# Patient Record
Sex: FEMALE | Race: WHITE | NOT HISPANIC OR LATINO | Employment: OTHER | URBAN - METROPOLITAN AREA
[De-identification: names, ages, dates, MRNs, and addresses within clinical notes are randomized per-mention and may not be internally consistent; named-entity substitution may affect disease eponyms.]

---

## 2022-09-12 DIAGNOSIS — C50.512 MALIGNANT NEOPLASM OF LOWER-OUTER QUADRANT OF LEFT FEMALE BREAST, UNSPECIFIED ESTROGEN RECEPTOR STATUS (HCC): Primary | ICD-10-CM

## 2022-09-14 ENCOUNTER — OFFICE VISIT (OUTPATIENT)
Dept: FAMILY MEDICINE CLINIC | Facility: CLINIC | Age: 82
End: 2022-09-14

## 2022-09-14 VITALS
TEMPERATURE: 97.8 F | OXYGEN SATURATION: 99 % | HEART RATE: 74 BPM | BODY MASS INDEX: 24.63 KG/M2 | WEIGHT: 127.2 LBS | RESPIRATION RATE: 16 BRPM | DIASTOLIC BLOOD PRESSURE: 78 MMHG | SYSTOLIC BLOOD PRESSURE: 136 MMHG

## 2022-09-14 DIAGNOSIS — N63.25 UNSPECIFIED LUMP IN THE LEFT BREAST, OVERLAPPING QUADRANTS: ICD-10-CM

## 2022-09-14 DIAGNOSIS — E55.9 VITAMIN D DEFICIENCY: ICD-10-CM

## 2022-09-14 DIAGNOSIS — E11.9 TYPE 2 DIABETES MELLITUS WITHOUT COMPLICATION, WITHOUT LONG-TERM CURRENT USE OF INSULIN (HCC): ICD-10-CM

## 2022-09-14 DIAGNOSIS — E78.2 MIXED HYPERLIPIDEMIA: ICD-10-CM

## 2022-09-14 DIAGNOSIS — N63.0 BREAST LUMP IN FEMALE: Primary | ICD-10-CM

## 2022-09-14 DIAGNOSIS — Z12.31 ENCOUNTER FOR SCREENING MAMMOGRAM FOR BREAST CANCER: ICD-10-CM

## 2022-09-14 DIAGNOSIS — E53.8 VITAMIN B12 DEFICIENCY: ICD-10-CM

## 2022-09-14 DIAGNOSIS — I10 BENIGN ESSENTIAL HYPERTENSION: ICD-10-CM

## 2022-09-14 DIAGNOSIS — K02.9 DENTAL CARIES: ICD-10-CM

## 2022-09-14 DIAGNOSIS — F41.8 DEPRESSION WITH ANXIETY: ICD-10-CM

## 2022-09-14 DIAGNOSIS — M81.0 AGE-RELATED OSTEOPOROSIS WITHOUT CURRENT PATHOLOGICAL FRACTURE: ICD-10-CM

## 2022-09-14 DIAGNOSIS — R01.1 CARDIAC MURMUR: ICD-10-CM

## 2022-09-14 DIAGNOSIS — Z00.00 MEDICARE ANNUAL WELLNESS VISIT, SUBSEQUENT: ICD-10-CM

## 2022-09-14 PROCEDURE — 99204 OFFICE O/P NEW MOD 45 MIN: CPT | Performed by: FAMILY MEDICINE

## 2022-09-14 PROCEDURE — G0439 PPPS, SUBSEQ VISIT: HCPCS | Performed by: FAMILY MEDICINE

## 2022-09-14 RX ORDER — AMLODIPINE BESYLATE 5 MG/1
2 TABLET ORAL DAILY
COMMUNITY
Start: 2014-11-24 | End: 2022-09-17 | Stop reason: ALTCHOICE

## 2022-09-14 RX ORDER — LORAZEPAM 0.5 MG/1
TABLET ORAL EVERY 4 HOURS
COMMUNITY
Start: 2014-07-01 | End: 2022-09-17 | Stop reason: ALTCHOICE

## 2022-09-14 RX ORDER — SITAGLIPTIN AND METFORMIN HYDROCHLORIDE 1000; 50 MG/1; MG/1
2 TABLET, FILM COATED, EXTENDED RELEASE ORAL DAILY
COMMUNITY
Start: 2014-10-25 | End: 2022-09-17 | Stop reason: ALTCHOICE

## 2022-09-14 RX ORDER — CHLORHEXIDINE GLUCONATE 0.12 MG/ML
RINSE ORAL
COMMUNITY
Start: 2014-10-16 | End: 2022-09-17 | Stop reason: ALTCHOICE

## 2022-09-14 RX ORDER — ESCITALOPRAM OXALATE 10 MG/1
TABLET ORAL
COMMUNITY
Start: 2014-07-01 | End: 2022-09-17 | Stop reason: ALTCHOICE

## 2022-09-14 RX ORDER — ATENOLOL 50 MG/1
50 TABLET ORAL DAILY
COMMUNITY

## 2022-09-14 RX ORDER — ATORVASTATIN CALCIUM 10 MG/1
1 TABLET, FILM COATED ORAL DAILY
COMMUNITY
Start: 2014-10-25 | End: 2022-09-17 | Stop reason: ALTCHOICE

## 2022-09-14 RX ORDER — VITAMIN B COMPLEX
1 CAPSULE ORAL DAILY
COMMUNITY

## 2022-09-14 RX ORDER — GABAPENTIN 100 MG/1
CAPSULE ORAL
COMMUNITY
Start: 2014-10-25 | End: 2022-09-17 | Stop reason: ALTCHOICE

## 2022-09-14 RX ORDER — LISINOPRIL AND HYDROCHLOROTHIAZIDE 20; 12.5 MG/1; MG/1
1 TABLET ORAL DAILY
COMMUNITY
End: 2022-09-17 | Stop reason: ALTCHOICE

## 2022-09-14 RX ORDER — AMLODIPINE BESYLATE 10 MG/1
1 TABLET ORAL DAILY
COMMUNITY

## 2022-09-14 RX ORDER — ALENDRONATE SODIUM 70 MG/1
70 TABLET ORAL
COMMUNITY
End: 2022-09-17

## 2022-09-14 RX ORDER — DICLOFENAC POTASSIUM 50 MG/1
50 TABLET, FILM COATED ORAL DAILY PRN
COMMUNITY

## 2022-09-14 RX ORDER — LETROZOLE 2.5 MG/1
2.5 TABLET, FILM COATED ORAL DAILY
COMMUNITY

## 2022-09-14 RX ORDER — RAMIPRIL 5 MG/1
5 CAPSULE ORAL DAILY
COMMUNITY

## 2022-09-14 RX ORDER — METOPROLOL SUCCINATE 50 MG/1
1 TABLET, EXTENDED RELEASE ORAL DAILY
COMMUNITY
Start: 2014-08-29 | End: 2022-09-17 | Stop reason: ALTCHOICE

## 2022-09-14 RX ORDER — GLIMEPIRIDE 1 MG/1
1 TABLET ORAL DAILY
COMMUNITY
Start: 2014-10-25 | End: 2022-09-17 | Stop reason: ALTCHOICE

## 2022-09-14 NOTE — PROGRESS NOTES
FAMILY PRACTICE OFFICE VISIT       NAME: Shahbaz Spencer  AGE: [de-identified] y o  SEX: female       : 1940        MRN: [id number]        Assessment and Plan     1  Age-related osteoporosis without current pathological fracture    2  Breast lump in female  -     Mammo diagnostic bilateral w 3d & cad; Future; Expected date: 2022  -     US guided breast biopsy left complete; Future; Expected date: 2022    3  Type 2 diabetes mellitus without complication, without long-term current use of insulin ([facility] )    4  Encounter for screening mammogram for breast cancer  -     Mammo diagnostic bilateral w 3d & cad; Future; Expected date: 2022  -     US guided breast biopsy left complete; Future; Expected date: 2022    5  Unspecified lump in the left breast, overlapping quadrants   -     Mammo diagnostic bilateral w 3d & cad; Future; Expected date: 2022    6  Unspecified lump in the right breast, overlapping quadrants   -     US guided breast biopsy left complete; Future; Expected date: 2022    7  Mixed hyperlipidemia  -     Lipid Panel with Direct LDL reflex; Future    8  Vitamin B12 deficiency  -     Vitamin B12; Future    9  Vitamin D deficiency  -     Vitamin D 25 hydroxy; Future    10  Medicare annual wellness visit, subsequent    11  Cardiac murmur  -     Echo complete w/ contrast if indicated; Future; Expected date: 2022    12  Dental caries             Patient Instructions       Medicare Preventive Visit Patient Instructions  Thank you for completing your Welcome to Medicare Visit or Medicare Annual Wellness Visit today  Your next wellness visit will be due in one year (9/15/2023)  The screening/preventive services that you may require over the next 5-10 years are detailed below  Some tests may not apply to you based off risk factors and/or age  Screening tests ordered at today's visit but not completed yet may show as past due   Also, please note that scanned in results may not display below   Preventive Screenings:  Service Recommendations Previous Testing/Comments   Colorectal Cancer Screening  * Colonoscopy    * Fecal Occult Blood Test (FOBT)/Fecal Immunochemical Test (FIT)  * Fecal DNA/Cologuard Test  * Flexible Sigmoidoscopy Age: 39-70 years old   Colonoscopy: every 10 years (may be performed more frequently if at higher risk)  OR  FOBT/FIT: every 1 year  OR  Cologuard: every 3 years  OR  Sigmoidoscopy: every 5 years  Screening may be recommended earlier than age 39 if at higher risk for colorectal cancer  Also, an individualized decision between you and your healthcare provider will decide whether screening between the ages of 74-80 would be appropriate  Colonoscopy: Not on file  FOBT/FIT: Not on file  Cologuard: Not on file  Sigmoidoscopy: Not on file          Breast Cancer Screening Age: 36 years old  Frequency: every 1-2 years  Not required if history of left and right mastectomy Mammogram: Not on file    History Breast Cancer   Cervical Cancer Screening Between the ages of 21-29, pap smear recommended once every 3 years  Between the ages of 33-67, can perform pap smear with HPV co-testing every 5 years  Recommendations may differ for women with a history of total hysterectomy, cervical cancer, or abnormal pap smears in past  Pap Smear: Not on file    Screening Not Indicated   Hepatitis C Screening Once for adults born between 1945 and 1965  More frequently in patients at high risk for Hepatitis C Hep C Antibody: Not on file        Diabetes Screening 1-2 times per year if you're at risk for diabetes or have pre-diabetes Fasting glucose: No results in last 5 years (No results in last 5 years)  A1C: No results in last 5 years (No results in last 5 years)  Screening Not Indicated  History Diabetes   Cholesterol Screening Once every 5 years if you don't have a lipid disorder  May order more often based on risk factors   Lipid panel: Not on file    Screening Not Indicated  History Lipid Disorder     Other Preventive Screenings Covered by Medicare:  1  Abdominal Aortic Aneurysm (AAA) Screening: covered once if your at risk  You're considered to be at risk if you have a family history of AAA  2  Lung Cancer Screening: covers low dose CT scan once per year if you meet all of the following conditions: (1) Age 50-69; (2) No signs or symptoms of lung cancer; (3) Current smoker or have quit smoking within the last 15 years; (4) You have a tobacco smoking history of at least 20 pack years (packs per day multiplied by number of years you smoked); (5) You get a written order from a healthcare provider  3  Glaucoma Screening: covered annually if you're considered high risk: (1) You have diabetes OR (2) Family history of glaucoma OR (3)  aged 48 and older OR (3)  American aged 72 and older  3  Osteoporosis Screening: covered every 2 years if you meet one of the following conditions: (1) You're estrogen deficient and at risk for osteoporosis based off medical history and other findings; (2) Have a vertebral abnormality; (3) On glucocorticoid therapy for more than 3 months; (4) Have primary hyperparathyroidism; (5) On osteoporosis medications and need to assess response to drug therapy  · Last bone density test (DXA Scan): Not on file  5  HIV Screening: covered annually if you're between the age of 12-76  Also covered annually if you are younger than 13 and older than 72 with risk factors for HIV infection  For pregnant patients, it is covered up to 3 times per pregnancy      Immunizations:  Immunization Recommendations   Influenza Vaccine Annual influenza vaccination during flu season is recommended for all persons aged >= 6 months who do not have contraindications   Pneumococcal Vaccine   * Pneumococcal conjugate vaccine = PCV13 (Prevnar 13), PCV15 (Vaxneuvance), PCV20 (Prevnar 20)  * Pneumococcal polysaccharide vaccine = PPSV23 (Pneumovax) Adults 25-60 years old: 1-3 doses may be recommended based on certain risk factors  Adults 72 years old: 1-2 doses may be recommended based off what pneumonia vaccine you previously received   Hepatitis B Vaccine 3 dose series if at intermediate or high risk (ex: diabetes, end stage renal disease, liver disease)   Tetanus (Td) Vaccine - COST NOT COVERED BY MEDICARE PART B Following completion of primary series, a booster dose should be given every 10 years to maintain immunity against tetanus  Td may also be given as tetanus wound prophylaxis  Tdap Vaccine - COST NOT COVERED BY MEDICARE PART B Recommended at least once for all adults  For pregnant patients, recommended with each pregnancy  Shingles Vaccine (Shingrix) - COST NOT COVERED BY MEDICARE PART B  2 shot series recommended in those aged 48 and above     Health Maintenance Due:      Topic Date Due    Breast Cancer Screening: Mammogram  Never done     Immunizations Due:      Topic Date Due    Pneumococcal Vaccine: 65+ Years (2 - PCV) 07/01/2015    Influenza Vaccine (1) 09/01/2022     Advance Directives   What are advance directives? Advance directives are legal documents that state your wishes and plans for medical care  These plans are made ahead of time in case you lose your ability to make decisions for yourself  Advance directives can apply to any medical decision, such as the treatments you want, and if you want to donate organs  What are the types of advance directives? There are many types of advance directives, and each state has rules about how to use them  You may choose a combination of any of the following:  · Living will: This is a written record of the treatment you want  You can also choose which treatments you do not want, which to limit, and which to stop at a certain time  This includes surgery, medicine, IV fluid, and tube feedings  · Durable power of  for healthcare [facility]):   This is a written record that states who you want to make healthcare choices for you when you are unable to make them for yourself  This person, called a proxy, is usually a family member or a friend  You may choose more than 1 proxy  · Do not resuscitate (DNR) order:  A DNR order is used in case your heart stops beating or you stop breathing  It is a request not to have certain forms of treatment, such as CPR  A DNR order may be included in other types of advance directives  · Medical directive: This covers the care that you want if you are in a coma, near death, or unable to make decisions for yourself  You can list the treatments you want for each condition  Treatment may include pain medicine, surgery, blood transfusions, dialysis, IV or tube feedings, and a ventilator (breathing machine)  · Values history: This document has questions about your views, beliefs, and how you feel and think about life  This information can help others choose the care that you would choose  Why are advance directives important? An advance directive helps you control your care  Although spoken wishes may be used, it is better to have your wishes written down  Spoken wishes can be misunderstood, or not followed  Treatments may be given even if you do not want them  An advance directive may make it easier for your family to make difficult choices about your care  © Copyright [employer] 2018 Information is for End User's use only and may not be sold, redistributed or otherwise used for commercial purposes  All illustrations and images included in CareNotes® are the copyrighted property of A [employer] A M , Inc  or [address] [place] [address]        No follow-ups on file  Discussed with the patient and all questioned fully answered  She will call me if any problems arise  M*Modal software was used to dictate this note  It may contain errors with dictating incorrect words/spelling  Please contact provider directly with any questions         Chief Complaint     Chief Complaint   Patient presents with   [facility] Wellness Visit     AWV    Follow-up     Routine F/U Care       History of Present Illness      Dm2, CVA    HTN   depression  Osteoporosis -fosamax        enod/  Dental disease        Remote hysterectomy     Glipizide/ metfromin     COVID     doizziness  - orthistatic   BPV   headaches     right  CVA 2 years  Left  Cheek and lip  Numbness   left  Arm fees weaker, heavy and numb    Walks-  No cane     Takes      fair appetiet    Stable weight   UI-  Stress and         2016       Review of Systems   Review of Systems    Active Problem List     Patient Active Problem List   Diagnosis    Hyperlipidemia    Type 2 diabetes mellitus ([facility] )    Depression with anxiety    Benign essential hypertension    Age-related osteoporosis without current pathological fracture       Past Medical History:  History reviewed  No pertinent past medical history  Past Surgical History:  Past Surgical History:   Procedure Laterality Date    HYSTERECTOMY         Family History:  Family History   Problem Relation Age of Onset    Hypertension Mother     No Known Problems Father        Social History:  Social History     Socioeconomic History    Marital status: /Civil Union     Spouse name: Not on file    Number of children: Not on file    Years of education: Not on file    Highest education level: Not on file   Occupational History    Not on file   Tobacco Use    Smoking status: Never Smoker    Smokeless tobacco: Never Used   Vaping Use    Vaping Use: Never used   Substance and Sexual Activity    Alcohol use: Not Currently    Drug use: Never    Sexual activity: Not on file   Other Topics Concern    Not on file   Social History Narrative    Not on file     Social Determinants of Health     Financial Resource Strain: Low Risk     Difficulty of Paying Living Expenses: Not hard at all   Food Insecurity: Not on file   Transportation Needs: No Transportation Needs    Lack of Transportation (Medical):  No    Lack of Transportation (Non-Medical):  No   Physical Activity: Not on file   Stress: Not on file   Social Connections: Not on file   Intimate Partner Violence: Not on file   Housing Stability: Not on file         Objective     Vitals:    09/14/22 1716 09/14/22 1804   BP: 124/68 136/78   BP Location: Left arm    Patient Position: Sitting    Cuff Size: Standard    Pulse: 74    Resp: 16    Temp: 97 8 °F (36 6 °C)    TempSrc: Temporal    SpO2: 99%    Weight: 57 7 kg (127 lb 3 2 oz)        Wt Readings from Last 3 Encounters:   09/14/22 57 7 kg (127 lb 3 2 oz)   10/25/14 58 7 kg (129 lb 6 1 oz)   07/01/14 58 5 kg (129 lb)       Physical Exam     Pertinent Laboratory/Diagnostic Studies:    Lab Results   Component Value Date    WBC 8 68 07/02/2014    HGB 12 6 07/02/2014    HCT 38 2 07/02/2014    MCV 85 07/02/2014     07/02/2014       No results found for: TSH    Lab Results   Component Value Date    CHOL 210 07/02/2014     Lab Results   Component Value Date    TRIG 127 07/02/2014     Lab Results   Component Value Date    HDL 66 07/02/2014     Lab Results   Component Value Date    LDLCALC 119 (H) 07/02/2014     Lab Results   Component Value Date    HGBA1C 7 5 (H) 10/18/2014     Lab Results   Component Value Date    K 4 3 10/18/2014     10/18/2014    CO2 32 10/18/2014    ANIONGAP 7 10/18/2014    BUN 15 10/18/2014    CREATININE 0 81 10/18/2014    CALCIUM 9 5 10/18/2014    AST 14 10/18/2014    ALT 25 10/18/2014    ALKPHOS 116 10/18/2014    PROT 7 2 10/18/2014    BILITOT 1 01 (H) 10/18/2014       Orders Placed This Encounter   Procedures    Mammo diagnostic bilateral w 3d & cad    US guided breast biopsy left complete    Lipid Panel with Direct LDL reflex    Vitamin D 25 hydroxy    Vitamin B12    Echo complete w/ contrast if indicated       ALLERGIES:  No Known Allergies    Current Medications     Current Outpatient Medications   Medication Sig Dispense Refill    alendronate (FOSAMAX) 70 mg tablet Take 70 mg by mouth every 7 days      amLODIPine (NORVASC) 10 mg tablet Take 1 tablet by mouth daily      ASPIRIN 81 PO Take 81 mg by mouth 2 (two) times a day      atenolol (TENORMIN) 50 mg tablet Take 50 mg by mouth daily      b complex vitamins capsule Take 1 capsule by mouth daily Vitamins B1, B6, B12      Calcium Carbonate-Vitamin D (CALCIUM-VITAMIN D3 PO) Take 1 tablet by mouth in the morning 500 mg/ 600 iu      diclofenac potassium (CATAFLAM) 50 mg tablet Take 50 mg by mouth daily as needed Arthritis      DIMENHYDRINATE PO Take 50 mg by mouth as needed For Motion sickness      DIMENHYDRINATE PO Take 120 mg by mouth daily at bedtime      Empagliflozin-metFORMIN HCl 12 5-1000 MG TABS Take 2 tablets by mouth 2 (two) times a day      letrozole (FEMARA) 2 5 mg tablet Take 2 5 mg by mouth daily      other medication, see sig, Medication/product name: Cinnarizine  Strength: 25 mg  Sig (include dose, route, frequency): po qhs      other medication, see sig, Medication/product name: Unicron (Gliclazide)  Strength: 30 mg  Sig (include dose, route, frequency): PO, BID      ramipril (ALTACE) 5 mg capsule Take 5 mg by mouth daily      amLODIPine (NORVASC) 5 mg tablet Take 2 tablets by mouth daily (Patient not taking: Reported on 9/14/2022)      atorvastatin (LIPITOR) 10 mg tablet Take 1 tablet by mouth daily (Patient not taking: Reported on 9/14/2022)      chlorhexidine (PERIDEX) 0 12 % solution Apply to the mouth or throat (Patient not taking: Reported on 9/14/2022)      escitalopram (LEXAPRO) 10 mg tablet Take by mouth (Patient not taking: Reported on 9/14/2022)      gabapentin (NEURONTIN) 100 mg capsule Take by mouth (Patient not taking: Reported on 9/14/2022)      glimepiride (AMARYL) 1 mg tablet Take 1 tablet by mouth Daily (Patient not taking: Reported on 9/14/2022)      lisinopril-hydrochlorothiazide (PRINZIDE,ZESTORETIC) 20-12 5 MG per tablet Take 1 tablet by mouth daily (Patient not taking: Reported on 9/14/2022)      LORazepam (ATIVAN) 0 5 mg tablet Take by mouth every 4 (four) hours (Patient not taking: Reported on 9/14/2022)      metoprolol succinate (TOPROL-XL) 50 mg 24 hr tablet Take 1 tablet by mouth daily (Patient not taking: Reported on 9/14/2022)      SITagliptin-metFORMIN HCl ER (Janumet XR)  MG TB24 Take 2 tablets by mouth daily (Patient not taking: Reported on 9/14/2022)       No current facility-administered medications for this visit  There are no discontinued medications      Health Maintenance     Health Maintenance   Topic Date Due    Medicare Annual Wellness Visit (AWV)  Never done    Diabetic Foot Exam  Never done    DM Eye Exam  Never done    Breast Cancer Screening: Mammogram  Never done    Osteoporosis Screening  Never done    HEMOGLOBIN A1C  04/18/2015    Pneumococcal Vaccine: 65+ Years (2 - PCV) 07/01/2015    Influenza Vaccine (1) 09/01/2022    COVID-19 Vaccine (1) 12/14/2022 (Originally 1940)    Fall Risk  09/14/2023    Urinary Incontinence Screening  09/14/2023    BMI: Adult  09/14/2023    HIB Vaccine  Aged Out    Hepatitis B Vaccine  Aged Out    IPV Vaccine  Aged Out    Hepatitis A Vaccine  Aged Out    Meningococcal ACWY Vaccine  Aged Out    HPV Vaccine  Aged [employer] History   Administered Date(s) Administered    INFLUENZA 10/25/2014    Influenza, seasonal, injectable 10/25/2014    Pneumococcal Polysaccharide PPV23 07/01/2014       Tika Mora MD

## 2022-09-14 NOTE — PROGRESS NOTES
Assessment and Plan:     Problem List Items Addressed This Visit        Endocrine    Type 2 diabetes mellitus ([facility] [address] )       Lab Results   Component Value Date    HGBA1C 8 1 (H) 09/17/2022     Proceed with blood work  Patient is on regimen of sulfonylurea, Jardiance and metformin  Establish plan of treatment based on lab results         Relevant Medications    Empagliflozin-metFORMIN HCl 12 5-1000 MG TABS       Cardiovascular and Mediastinum    Benign essential hypertension     Blood pressure is well controlled  Continue regimen of amlodipine 10 mg daily, atenolol 50 mg daily and ramipril 5 mg daily         Relevant Medications    amLODIPine (NORVASC) 10 mg tablet    ramipril (ALTACE) 5 mg capsule    atenolol (TENORMIN) 50 mg tablet       Musculoskeletal and Integument    Age-related osteoporosis without current pathological fracture     Patient with multiple dental cavities  Hold Fosamax for now pending dental treatment            Other    Hyperlipidemia     Currently off statin, assess labs, plan to restart if needed         Relevant Orders    Lipid Panel with Direct LDL reflex (Completed)    Depression with anxiety     Patient has been using sleep aids, no daily antidepressant  Proceed with labs, I will discuss start of antidepressant with patient and her son within next few days  Breast lump in female - Primary     Pending bilateral diagnostic mammogram, left breast lump appears suspicious for malignancy  Orders for left breast biopsy placed           Relevant Orders    Mammo diagnostic bilateral w 3d & cad (Completed)      Other Visit Diagnoses     Encounter for screening mammogram for breast cancer        Relevant Orders    Mammo diagnostic bilateral w 3d & cad (Completed)    Unspecified lump in the left breast, overlapping quadrants         Relevant Orders    Mammo diagnostic bilateral w 3d & cad (Completed)    Vitamin B12 deficiency        Relevant Orders    Vitamin B12 (Completed) Vitamin D deficiency        Relevant Orders    Vitamin D 25 hydroxy (Completed)    Medicare annual wellness visit, subsequent        Cardiac murmur        Relevant Orders    Echo complete w/ contrast if indicated    Dental caries          Patient presents to reestablish care with our practice  Recent finding of left breast lump and left axillary lymphadenopathy, pending diagnostic mammogram and possible left breast biopsy on September 16th  Patient will be scheduled for follow-up with Surgical Oncology/oncology based on results  Proceed with complete blood work  Schedule dental exam  Stop Fosamax for now  Check echocardiogram   Further recommendation based on blood work results  Consider start of alternative sleep aid and antidepressant due to chronic depression, anxiety and insomnia symptoms  We will finalize plan treatment plans after blood work is completed and I will contact patient's son with an update  Preventive health issues were discussed with patient, and age appropriate screening tests were ordered as noted in patient's After Visit Summary  Personalized health advice and appropriate referrals for health education or preventive services given if needed, as noted in patient's After Visit Summary  History of Present Illness:     Patient presents for a Medicare Wellness Visit     Patient presents to reestablish care with our practice  She was not seen since 2014  She is here today accompanied by her son, my colleague,Dr Herrera, and her granddaughter  Patient with complex medical history including type 2 diabetes, non insulin dependent, CVA, hypertension, depression, osteoporosis, insomnia, hyperlipidemia  Medications updated today, med list reconciled  Patient is on regimen of metformin, Jardiance and sulfonylurea for treatment of type 2 diabetes  No recent hemoglobin A1c  No current medications for hyperlipidemia      Blood pressure appears well controlled on amlodipine, atenolol and ramipril  Patient does complain of intermittent orthostatic dizziness and what appears to be possible BPV  She describes infrequent headaches, no migraines, no visual changes  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 as well as left arm weakness  Arm feels heavy and numb at times  Patient ambulates without support and is reluctant to use the cane even though she appears to be bit unsteady for her family members  Appetite is fair  Weight is stable  Chronic insomnia  She has been using sleep aids, family members are concerned that they are causing side effects of daytime somnolence and unsteadiness  Patient with longstanding history of depression and anxiety  She is  as of 2016  Back in 2014 her symptoms were managed on Lexapro, subsequently medication was discontinued  Chronic stress and overflow incontinence  Remote history of hysterectomy  Patient's son is concerned about her dental health  Significant gum receding, patient is past due dental exam   Chronic low back pain  The biggest concern today is recent finding of left breast lump  Reportedly patient had mammogram performed outside the [place] confirming left breast lump and left axillary lymphadenopathy  Patient found this lump approximately 2 to 3 months ago  She has recent possiblly started Femara 2 5 mg daily and is scheduled for bilateral diagnostic mammogram with possible left breast biopsy in 2 days  Patient Care Team:  Roma Park MD as PCP - General     Review of Systems:     Review of Systems   Constitutional: Negative  HENT: Negative  Eyes: Negative  Respiratory: Negative  Cardiovascular: Negative  Gastrointestinal: Negative  Endocrine: Negative  Genitourinary: Negative  Stress and overflow urinary incontinence    Left breast lump   Musculoskeletal: Positive for arthralgias  Skin: Negative  Neurological: Positive for weakness (left arm) and light-headedness (Occasional)  Negative for headaches  Psychiatric/Behavioral: Positive for dysphoric mood and sleep disturbance  The patient is nervous/anxious  Problem List:     Patient Active Problem List   Diagnosis    Hyperlipidemia    Type 2 diabetes mellitus ([facility] [address] )    Depression with anxiety    Benign essential hypertension    Age-related osteoporosis without current pathological fracture    Breast lump in female      Past Medical and Surgical History:     History reviewed  No pertinent past medical history  Past Surgical History:   Procedure Laterality Date    HYSTERECTOMY      US BREAST NEEDLE LOC LEFT Left 9/16/2022    US BREAST NEEDLE LOC RIGHT EACH ADDITIONAL Right 9/16/2022    US GUIDED BREAST BIOPSY LEFT COMPLETE Left 9/16/2022    US GUIDED BREAST LYMPH NODE BIOPSY LEFT Left 9/16/2022      Family History:     Family History   Problem Relation Age of Onset    Hypertension Mother     No Known Problems Father     Lung cancer Brother     Breast cancer Neg Hx     Colon cancer Neg Hx     Endometrial cancer Neg Hx       Social History:     Social History     Socioeconomic History    Marital status: /Civil Union     Spouse name: None    Number of children: None    Years of education: None    Highest education level: None   Occupational History    None   Tobacco Use    Smoking status: Never Smoker    Smokeless tobacco: Never Used   Vaping Use    Vaping Use: Never used   Substance and Sexual Activity    Alcohol use: Not Currently    Drug use: Never    Sexual activity: None   Other Topics Concern    None   Social History Narrative    None     Social Determinants of Health     Financial Resource Strain: Low Risk     Difficulty of Paying Living Expenses: Not hard at all   Food Insecurity: Not on file   Transportation Needs: No Transportation Needs    Lack of Transportation (Medical):  No    Lack of Transportation (Non-Medical): No   Physical Activity: Not on file   Stress: Not on file   Social Connections: Not on file   Intimate Partner Violence: Not on file   Housing Stability: Not on file      Medications and Allergies:     Current Outpatient Medications   Medication Sig Dispense Refill    amLODIPine (NORVASC) 10 mg tablet Take 1 tablet by mouth daily      ASPIRIN 81 PO Take 81 mg by mouth 2 (two) times a day      atenolol (TENORMIN) 50 mg tablet Take 50 mg by mouth daily      b complex vitamins capsule Take 1 capsule by mouth daily Vitamins B1, B6, B12      Calcium Carbonate-Vitamin D (CALCIUM-VITAMIN D3 PO) Take 1 tablet by mouth in the morning 500 mg/ 600 iu      diclofenac potassium (CATAFLAM) 50 mg tablet Take 50 mg by mouth daily as needed Arthritis      DIMENHYDRINATE PO Take 50 mg by mouth as needed For Motion sickness      DIMENHYDRINATE PO Take 120 mg by mouth daily at bedtime      Empagliflozin-metFORMIN HCl 12 5-1000 MG TABS Take 1 tablet by mouth 2 (two) times a day       letrozole (FEMARA) 2 5 mg tablet Take 2 5 mg by mouth daily      other medication, see sig, Medication/product name: Cinnarizine  Strength: 25 mg  Sig (include dose, route, frequency): po qhs      other medication, see sig, Medication/product name: Unicron (Gliclazide)  Strength: 30 mg  Sig (include dose, route, frequency): PO, BID      ramipril (ALTACE) 5 mg capsule Take 5 mg by mouth daily       No current facility-administered medications for this visit       No Known Allergies   Immunizations:     Immunization History   Administered Date(s) Administered    INFLUENZA 10/25/2014    Influenza, seasonal, injectable 10/25/2014    Pneumococcal Polysaccharide PPV23 07/01/2014      Health Maintenance:         Topic Date Due    Breast Cancer Screening: Mammogram  09/16/2023         Topic Date Due    Pneumococcal Vaccine: 65+ Years (2 - PCV) 07/01/2015    Influenza Vaccine (1) 09/01/2022      Medicare Screening Tests and Risk Assessments:     Shahbaz is here for her Subsequent Wellness visit  Health Risk Assessment:   Patient rates overall health as fair  Patient feels that their physical health rating is same  Patient is satisfied with their life  Eyesight was rated as same  Hearing was rated as same  Patient feels that their emotional and mental health rating is slightly worse  Patients states they are never, rarely angry  Patient states they are sometimes unusually tired/fatigued  Pain experienced in the last 7 days has been none  Patient states that she has experienced no weight loss or gain in last 6 months  Depression Screening:   PHQ-2 Score: 2      Fall Risk Screening: In the past year, patient has experienced: no history of falling in past year      Urinary Incontinence Screening:   Patient has not leaked urine accidently in the last six months  Home Safety:  Patient does not have trouble with stairs inside or outside of their home  Patient has working smoke alarms and has working carbon monoxide detector  Home safety hazards include: none  Nutrition:   Current diet is Regular  Medications:   Patient is currently taking over-the-counter supplements  OTC medications include: see medication list  Patient is able to manage medications  Activities of Daily Living (ADLs)/Instrumental Activities of Daily Living (IADLs):   Walk and transfer into and out of bed and chair?: Yes  Dress and groom yourself?: Yes    Bathe or shower yourself?: Yes    Feed yourself? Yes  Do your laundry/housekeeping?: Yes  Manage your money, pay your bills and track your expenses?: Yes  Make your own meals?: Yes    Do your own shopping?: Yes    Previous Hospitalizations:   Any hospitalizations or ED visits within the last 12 months?: No      Advance Care Planning:   Living will: Yes    Durable POA for healthcare:  Yes    Advanced directive: Yes      Cognitive Screening:   Provider or family/friend/caregiver concerned regarding cognition?: No    PREVENTIVE SCREENINGS      Cardiovascular Screening:    General: Screening Not Indicated and History Lipid Disorder      Diabetes Screening:     General: Screening Not Indicated and History Diabetes      Colorectal Cancer Screening:     General: Screening Not Indicated      Breast Cancer Screening:     General: History Breast Cancer and Screening Not Indicated      Cervical Cancer Screening:    General: Screening Not Indicated      Osteoporosis Screening:    General: Screening Not Indicated and History Osteoporosis      Abdominal Aortic Aneurysm (AAA) Screening:        General: Screening Not Indicated      Lung Cancer Screening:     General: Screening Not Indicated      Hepatitis C Screening:    General: Screening Not Indicated    Screening, Brief Intervention, and Referral to Treatment (SBIRT)    Screening  Typical number of drinks in a day: 0  Typical number of drinks in a week: 0  Interpretation: Low risk drinking behavior  Single Item Drug Screening:  How often have you used an illegal drug (including marijuana) or a prescription medication for non-medical reasons in the past year? never    Single Item Drug Screen Score: 0  Interpretation: Negative screen for possible drug use disorder    Brief Intervention  Alcohol & drug use screenings were reviewed  No concerns regarding substance use disorder identified  No exam data present     Physical Exam:     /78   Pulse 74   Temp 97 8 °F (36 6 °C) (Temporal)   Resp 16   Wt 57 7 kg (127 lb 3 2 oz)   SpO2 99%   BMI 24 63 kg/m²     Physical Exam  Vitals and nursing note reviewed  Constitutional:       General: She is not in acute distress  Appearance: Normal appearance  She is well-developed  She is not ill-appearing  HENT:      Head: Normocephalic and atraumatic  Right Ear: Tympanic membrane normal       Left Ear: Tympanic membrane normal       Mouth/Throat:      Mouth: Mucous membranes are moist  No oral lesions        Dentition: Abnormal dentition  Dental caries present  No gingival swelling or dental abscesses  Eyes:      General: No scleral icterus  Conjunctiva/sclera: Conjunctivae normal    Neck:      Vascular: No carotid bruit  Cardiovascular:      Rate and Rhythm: Normal rate and regular rhythm  Heart sounds: Murmur heard  Comments: Soft flow diastolic murmur 1- 2/6  Pulmonary:      Effort: Pulmonary effort is normal  No respiratory distress  Breath sounds: Normal breath sounds  Chest:   Breasts:      Right: Normal  No swelling or inverted nipple  Left: Mass present  No swelling, inverted nipple, skin change, tenderness or axillary adenopathy  Comments: Left breast mass, nonmovable, around 3 to 4 o'clock  Abdominal:      General: Bowel sounds are normal  There is no abdominal bruit  Palpations: Abdomen is soft  Tenderness: There is no abdominal tenderness  There is no guarding  Hernia: No hernia is present  Musculoskeletal:      Cervical back: Neck supple  No rigidity  Right lower leg: No edema  Left lower leg: No edema  Lymphadenopathy:      Upper Body:      Left upper body: No axillary adenopathy  Skin:     General: Skin is warm  Neurological:      General: No focal deficit present  Mental Status: She is alert and oriented to person, place, and time  Psychiatric:         Mood and Affect: Mood normal          Behavior: Behavior normal          Thought Content:  Thought content normal           Claudio Roper MD

## 2022-09-14 NOTE — PATIENT INSTRUCTIONS
Medicare Preventive Visit Patient Instructions  Thank you for completing your Welcome to Medicare Visit or Medicare Annual Wellness Visit today  Your next wellness visit will be due in one year (9/15/2023)  The screening/preventive services that you may require over the next 5-10 years are detailed below  Some tests may not apply to you based off risk factors and/or age  Screening tests ordered at today's visit but not completed yet may show as past due  Also, please note that scanned in results may not display below  Preventive Screenings:  Service Recommendations Previous Testing/Comments   Colorectal Cancer Screening  * Colonoscopy    * Fecal Occult Blood Test (FOBT)/Fecal Immunochemical Test (FIT)  * Fecal DNA/Cologuard Test  * Flexible Sigmoidoscopy Age: 39-70 years old   Colonoscopy: every 10 years (may be performed more frequently if at higher risk)  OR  FOBT/FIT: every 1 year  OR  Cologuard: every 3 years  OR  Sigmoidoscopy: every 5 years  Screening may be recommended earlier than age 39 if at higher risk for colorectal cancer  Also, an individualized decision between you and your healthcare provider will decide whether screening between the ages of 74-80 would be appropriate  Colonoscopy: Not on file  FOBT/FIT: Not on file  Cologuard: Not on file  Sigmoidoscopy: Not on file          Breast Cancer Screening Age: 36 years old  Frequency: every 1-2 years  Not required if history of left and right mastectomy Mammogram: Not on file    History Breast Cancer   Cervical Cancer Screening Between the ages of 21-29, pap smear recommended once every 3 years  Between the ages of 33-67, can perform pap smear with HPV co-testing every 5 years     Recommendations may differ for women with a history of total hysterectomy, cervical cancer, or abnormal pap smears in past  Pap Smear: Not on file    Screening Not Indicated   Hepatitis C Screening Once for adults born between 1945 and 1965  More frequently in patients at high risk for Hepatitis C Hep C Antibody: Not on file        Diabetes Screening 1-2 times per year if you're at risk for diabetes or have pre-diabetes Fasting glucose: No results in last 5 years (No results in last 5 years)  A1C: No results in last 5 years (No results in last 5 years)  Screening Not Indicated  History Diabetes   Cholesterol Screening Once every 5 years if you don't have a lipid disorder  May order more often based on risk factors  Lipid panel: Not on file    Screening Not Indicated  History Lipid Disorder     Other Preventive Screenings Covered by Medicare:  1  Abdominal Aortic Aneurysm (AAA) Screening: covered once if your at risk  You're considered to be at risk if you have a family history of AAA  2  Lung Cancer Screening: covers low dose CT scan once per year if you meet all of the following conditions: (1) Age 50-69; (2) No signs or symptoms of lung cancer; (3) Current smoker or have quit smoking within the last 15 years; (4) You have a tobacco smoking history of at least 20 pack years (packs per day multiplied by number of years you smoked); (5) You get a written order from a healthcare provider  3  Glaucoma Screening: covered annually if you're considered high risk: (1) You have diabetes OR (2) Family history of glaucoma OR (3)  aged 48 and older OR (3)  American aged 72 and older  3  Osteoporosis Screening: covered every 2 years if you meet one of the following conditions: (1) You're estrogen deficient and at risk for osteoporosis based off medical history and other findings; (2) Have a vertebral abnormality; (3) On glucocorticoid therapy for more than 3 months; (4) Have primary hyperparathyroidism; (5) On osteoporosis medications and need to assess response to drug therapy  · Last bone density test (DXA Scan): Not on file  5  HIV Screening: covered annually if you're between the age of 12-76   Also covered annually if you are younger than 13 and older than 72 with risk factors for HIV infection  For pregnant patients, it is covered up to 3 times per pregnancy  Immunizations:  Immunization Recommendations   Influenza Vaccine Annual influenza vaccination during flu season is recommended for all persons aged >= 6 months who do not have contraindications   Pneumococcal Vaccine   * Pneumococcal conjugate vaccine = PCV13 (Prevnar 13), PCV15 (Vaxneuvance), PCV20 (Prevnar 20)  * Pneumococcal polysaccharide vaccine = PPSV23 (Pneumovax) Adults 25-60 years old: 1-3 doses may be recommended based on certain risk factors  Adults 72 years old: 1-2 doses may be recommended based off what pneumonia vaccine you previously received   Hepatitis B Vaccine 3 dose series if at intermediate or high risk (ex: diabetes, end stage renal disease, liver disease)   Tetanus (Td) Vaccine - COST NOT COVERED BY MEDICARE PART B Following completion of primary series, a booster dose should be given every 10 years to maintain immunity against tetanus  Td may also be given as tetanus wound prophylaxis  Tdap Vaccine - COST NOT COVERED BY MEDICARE PART B Recommended at least once for all adults  For pregnant patients, recommended with each pregnancy  Shingles Vaccine (Shingrix) - COST NOT COVERED BY MEDICARE PART B  2 shot series recommended in those aged 48 and above     Health Maintenance Due:      Topic Date Due    Breast Cancer Screening: Mammogram  Never done     Immunizations Due:      Topic Date Due    Pneumococcal Vaccine: 65+ Years (2 - PCV) 07/01/2015    Influenza Vaccine (1) 09/01/2022     Advance Directives   What are advance directives? Advance directives are legal documents that state your wishes and plans for medical care  These plans are made ahead of time in case you lose your ability to make decisions for yourself  Advance directives can apply to any medical decision, such as the treatments you want, and if you want to donate organs  What are the types of advance directives? There are many types of advance directives, and each state has rules about how to use them  You may choose a combination of any of the following:  · Living will: This is a written record of the treatment you want  You can also choose which treatments you do not want, which to limit, and which to stop at a certain time  This includes surgery, medicine, IV fluid, and tube feedings  · Durable power of  for healthcare [facility]): This is a written record that states who you want to make healthcare choices for you when you are unable to make them for yourself  This person, called a proxy, is usually a family member or a friend  You may choose more than 1 proxy  · Do not resuscitate (DNR) order:  A DNR order is used in case your heart stops beating or you stop breathing  It is a request not to have certain forms of treatment, such as CPR  A DNR order may be included in other types of advance directives  · Medical directive: This covers the care that you want if you are in a coma, near death, or unable to make decisions for yourself  You can list the treatments you want for each condition  Treatment may include pain medicine, surgery, blood transfusions, dialysis, IV or tube feedings, and a ventilator (breathing machine)  · Values history: This document has questions about your views, beliefs, and how you feel and think about life  This information can help others choose the care that you would choose  Why are advance directives important? An advance directive helps you control your care  Although spoken wishes may be used, it is better to have your wishes written down  Spoken wishes can be misunderstood, or not followed  Treatments may be given even if you do not want them  An advance directive may make it easier for your family to make difficult choices about your care         © Copyright [employer] 2018 Information is for End User's use only and may not be sold, redistributed or otherwise used for commercial purposes   All illustrations and images included in CareNotes® are the copyrighted property of A [employer] M [employer] Inc  or [facility] Valdes

## 2022-09-16 ENCOUNTER — HOSPITAL ENCOUNTER (OUTPATIENT)
Dept: ULTRASOUND IMAGING | Facility: CLINIC | Age: 82
Discharge: HOME | End: 2022-09-16
Payer: MEDICARE

## 2022-09-16 ENCOUNTER — HOSPITAL ENCOUNTER (OUTPATIENT)
Dept: MAMMOGRAPHY | Facility: CLINIC | Age: 82
Discharge: HOME | End: 2022-09-16
Payer: MEDICARE

## 2022-09-16 VITALS — HEART RATE: 88 BPM | DIASTOLIC BLOOD PRESSURE: 84 MMHG | SYSTOLIC BLOOD PRESSURE: 142 MMHG

## 2022-09-16 VITALS — BODY MASS INDEX: 24.94 KG/M2 | WEIGHT: 127 LBS | HEIGHT: 60 IN

## 2022-09-16 DIAGNOSIS — R92.8 ABNORMAL MAMMOGRAM: ICD-10-CM

## 2022-09-16 DIAGNOSIS — N63.0 BREAST LUMP IN FEMALE: ICD-10-CM

## 2022-09-16 DIAGNOSIS — Z12.31 ENCOUNTER FOR SCREENING MAMMOGRAM FOR BREAST CANCER: ICD-10-CM

## 2022-09-16 DIAGNOSIS — R92.8 ABNORMAL ULTRASOUND OF BREAST: ICD-10-CM

## 2022-09-16 DIAGNOSIS — N63.20 LEFT BREAST LUMP: ICD-10-CM

## 2022-09-16 DIAGNOSIS — N63.25 UNSPECIFIED LUMP IN THE LEFT BREAST, OVERLAPPING QUADRANTS: ICD-10-CM

## 2022-09-16 PROCEDURE — A4648 IMPLANTABLE TISSUE MARKER: HCPCS

## 2022-09-16 PROCEDURE — 77066 DX MAMMO INCL CAD BI: CPT

## 2022-09-16 PROCEDURE — 88305 TISSUE EXAM BY PATHOLOGIST: CPT | Performed by: PATHOLOGY

## 2022-09-16 PROCEDURE — 38505 NEEDLE BIOPSY LYMPH NODES: CPT

## 2022-09-16 PROCEDURE — 76942 ECHO GUIDE FOR BIOPSY: CPT

## 2022-09-16 PROCEDURE — 19083 BX BREAST 1ST LESION US IMAG: CPT

## 2022-09-16 PROCEDURE — 88341 IMHCHEM/IMCYTCHM EA ADD ANTB: CPT | Performed by: PATHOLOGY

## 2022-09-16 PROCEDURE — 88342 IMHCHEM/IMCYTCHM 1ST ANTB: CPT | Performed by: PATHOLOGY

## 2022-09-16 PROCEDURE — G0279 TOMOSYNTHESIS, MAMMO: HCPCS

## 2022-09-16 PROCEDURE — 19285 PERQ DEV BREAST 1ST US IMAG: CPT

## 2022-09-16 PROCEDURE — 88374 M/PHMTRC ALYS ISHQUANT/SEMIQ: CPT | Performed by: PATHOLOGY

## 2022-09-16 PROCEDURE — 88360 TUMOR IMMUNOHISTOCHEM/MANUAL: CPT | Performed by: PATHOLOGY

## 2022-09-16 PROCEDURE — 19286 PERQ DEV BREAST ADD US IMAG: CPT

## 2022-09-16 PROCEDURE — 76642 ULTRASOUND BREAST LIMITED: CPT

## 2022-09-16 RX ORDER — LIDOCAINE HYDROCHLORIDE 10 MG/ML
5 INJECTION, SOLUTION EPIDURAL; INFILTRATION; INTRACAUDAL; PERINEURAL ONCE
Status: DISCONTINUED | OUTPATIENT
Start: 2022-09-16 | End: 2022-09-17 | Stop reason: HOSPADM

## 2022-09-16 RX ORDER — LIDOCAINE HYDROCHLORIDE 10 MG/ML
5 INJECTION, SOLUTION EPIDURAL; INFILTRATION; INTRACAUDAL; PERINEURAL ONCE
Status: COMPLETED | OUTPATIENT
Start: 2022-09-16 | End: 2022-09-16

## 2022-09-16 RX ADMIN — LIDOCAINE HYDROCHLORIDE 5 ML: 10 INJECTION, SOLUTION EPIDURAL; INFILTRATION; INTRACAUDAL; PERINEURAL at 15:31

## 2022-09-16 RX ADMIN — LIDOCAINE HYDROCHLORIDE 5 ML: 10 INJECTION, SOLUTION EPIDURAL; INFILTRATION; INTRACAUDAL; PERINEURAL at 15:26

## 2022-09-16 NOTE — PROGRESS NOTES
Patient arrived via:    __x___ambulatory    _____wheelchair    _____stretcher      Domestic violence screen    ______negative______positive (not done-pt's son with her for procedure)    Breast Implants:    _______yes ___x_____no

## 2022-09-16 NOTE — PROGRESS NOTES
Met with patient    regarding recommendation for;    _____ RIGHT ___x___LEFT      __x___Ultrasound guided  ______Stereotactic breast biopsy  __X___Verbalized understanding        Blood thinners:  No: _x____ Yes: ______ What:                 Biopsy teaching sheet given:  Yes: ___X___ No: ________    Pt given contact information and adv to call with any questions/needs

## 2022-09-16 NOTE — PROGRESS NOTES
Procedure type:    __x___ultrasound guided _____stereotactic    Breast:    __x___Left _____Right    Location: 3:00 10cm from nipple    Needle: 14g Bard Marquee    # of passes: 3 (specimen 1)    Clip: [employer] reflector    Performed by: Dr Katharine Henderson    Pressure held for 5 minutes by: Alex Guajardoi Strips:    __x___yes _____no    Band aid:    __x___yes_____no    Tape and guaze:    _____yes _x____no    Tolerated procedure:    __x___yes _____no        Procedure type:    __x___ultrasound guided _____stereotactic    Breast/axilla:    __x___Left _____Right    Location: axillary lymph node    Needle: 16g Bard Marquee    # of passes: 2 (specimen 2)    Clip:  [employer]out reflector    Performed by: Dr Katharine Henderson    Pressure held for 5 minutes by: Alex Vega    Steri Strips:    __x___yes _____no    Band aid:    __x___yes_____no    Tape and guaze:    _____yes __x___no    Tolerated procedure:    __x___yes _____no

## 2022-09-16 NOTE — PROGRESS NOTES
Ice pack given:    __x___yes _____no    Discharge instructions signed by patient:    _____yes __x___no (signed by pt's son Delaney Gay)    Discharge instructions given to patient:    __x___yes _____no    Discharged via:    __x___ambulatory    _____wheelchair    _____stretcher    Stable on discharge:    __x___yes ____no

## 2022-09-16 NOTE — DISCHARGE INSTR - OTHER ORDERS
POST LARGE CORE BREAST BIOPSY PATIENT INFORMATION      Place an ice pack inside your bra over the top of the dressing every hour for 20 minutes (20 minutes on, 60 minutes off)  Do this until bedtime  Do not shower or bathe until the following morning  After bathing, you may remove the bandaid  You may bathe your breast carefully with the steri-strips in place  Be careful not to loosen them  The steri-strips will fall off in 3-5 days  You may have mild discomfort, and you may have some bruising where the needle entered the skin  This should clear within 5-7 days  If you need medicine for discomfort, take acetaminophen products such as Tylenol  You may also take Advil or Motrin products  DO NOT use Aspirin for the first 24 hours  Do not participate in strenuous activities such as-tennis, aerobics, weight lifting, etc  for 24 hours  Refrain from swimming/soaking for 72 hours  Wearing a bra for sleeping may be more comfortable for the first 24-48 hours after your biopsy  Watch for continued bleeding, pain or fever over 101  If any of these symptoms occur, please contact our breast nurse navigator at the location where your biopsy was performed  During normal business hours (7:30am - 4:00pm) please call the nurse navigator at the site     where your procedure was performed:       [address]: [phone] or [phone] [address]: [phone] or [phone]     [address]: [address] [facility]: [address]: [phone]        After 4pm - please call your physician or go to the nearest Emergency Department location  The final results of your biopsy are usually available within one week

## 2022-09-17 ENCOUNTER — APPOINTMENT (OUTPATIENT)
Dept: LAB | Facility: CLINIC | Age: 82
End: 2022-09-17

## 2022-09-17 DIAGNOSIS — E55.9 VITAMIN D DEFICIENCY: ICD-10-CM

## 2022-09-17 DIAGNOSIS — E53.8 VITAMIN B12 DEFICIENCY: ICD-10-CM

## 2022-09-17 DIAGNOSIS — C50.512 MALIGNANT NEOPLASM OF LOWER-OUTER QUADRANT OF LEFT FEMALE BREAST, UNSPECIFIED ESTROGEN RECEPTOR STATUS (HCC): ICD-10-CM

## 2022-09-17 DIAGNOSIS — E78.2 MIXED HYPERLIPIDEMIA: ICD-10-CM

## 2022-09-17 PROBLEM — N63.0 BREAST LUMP IN FEMALE: Status: ACTIVE | Noted: 2022-09-17

## 2022-09-17 LAB
25(OH)D3 SERPL-MCNC: 35.7 NG/ML (ref 30–100)
ALBUMIN SERPL BCP-MCNC: 3.4 G/DL (ref 3.5–5)
ALP SERPL-CCNC: 95 U/L (ref 46–116)
ALT SERPL W P-5'-P-CCNC: 25 U/L (ref 12–78)
ANION GAP SERPL CALCULATED.3IONS-SCNC: 4 MMOL/L (ref 4–13)
AST SERPL W P-5'-P-CCNC: 13 U/L (ref 5–45)
BASOPHILS # BLD AUTO: 0.07 THOUSANDS/ΜL (ref 0–0.1)
BASOPHILS NFR BLD AUTO: 1 % (ref 0–1)
BILIRUB SERPL-MCNC: 0.9 MG/DL (ref 0.2–1)
BUN SERPL-MCNC: 18 MG/DL (ref 5–25)
CALCIUM ALBUM COR SERPL-MCNC: 10 MG/DL (ref 8.3–10.1)
CALCIUM SERPL-MCNC: 9.5 MG/DL (ref 8.3–10.1)
CANCER AG27-29 SERPL-ACNC: 6.4 U/ML (ref 0–42.3)
CHLORIDE SERPL-SCNC: 102 MMOL/L (ref 96–108)
CHOLEST SERPL-MCNC: 211 MG/DL
CO2 SERPL-SCNC: 31 MMOL/L (ref 21–32)
CREAT SERPL-MCNC: 1.14 MG/DL (ref 0.6–1.3)
EOSINOPHIL # BLD AUTO: 0.16 THOUSAND/ΜL (ref 0–0.61)
EOSINOPHIL NFR BLD AUTO: 2 % (ref 0–6)
ERYTHROCYTE [DISTWIDTH] IN BLOOD BY AUTOMATED COUNT: 13.5 % (ref 11.6–15.1)
EST. AVERAGE GLUCOSE BLD GHB EST-MCNC: 186 MG/DL
GFR SERPL CREATININE-BSD FRML MDRD: 45 ML/MIN/1.73SQ M
GLUCOSE P FAST SERPL-MCNC: 203 MG/DL (ref 65–99)
HBA1C MFR BLD: 8.1 %
HCT VFR BLD AUTO: 41.2 % (ref 34.8–46.1)
HDLC SERPL-MCNC: 52 MG/DL
HGB BLD-MCNC: 13.1 G/DL (ref 11.5–15.4)
IMM GRANULOCYTES # BLD AUTO: 0.04 THOUSAND/UL (ref 0–0.2)
IMM GRANULOCYTES NFR BLD AUTO: 1 % (ref 0–2)
LDLC SERPL CALC-MCNC: 114 MG/DL (ref 0–100)
LYMPHOCYTES # BLD AUTO: 2 THOUSANDS/ΜL (ref 0.6–4.47)
LYMPHOCYTES NFR BLD AUTO: 28 % (ref 14–44)
MCH RBC QN AUTO: 27.6 PG (ref 26.8–34.3)
MCHC RBC AUTO-ENTMCNC: 31.8 G/DL (ref 31.4–37.4)
MCV RBC AUTO: 87 FL (ref 82–98)
MONOCYTES # BLD AUTO: 0.57 THOUSAND/ΜL (ref 0.17–1.22)
MONOCYTES NFR BLD AUTO: 8 % (ref 4–12)
NEUTROPHILS # BLD AUTO: 4.25 THOUSANDS/ΜL (ref 1.85–7.62)
NEUTS SEG NFR BLD AUTO: 60 % (ref 43–75)
NRBC BLD AUTO-RTO: 0 /100 WBCS
PLATELET # BLD AUTO: 281 THOUSANDS/UL (ref 149–390)
PMV BLD AUTO: 12.2 FL (ref 8.9–12.7)
POTASSIUM SERPL-SCNC: 4.6 MMOL/L (ref 3.5–5.3)
PROT SERPL-MCNC: 7.2 G/DL (ref 6.4–8.4)
RBC # BLD AUTO: 4.74 MILLION/UL (ref 3.81–5.12)
SODIUM SERPL-SCNC: 137 MMOL/L (ref 135–147)
TRIGL SERPL-MCNC: 225 MG/DL
TSH SERPL DL<=0.05 MIU/L-ACNC: 1.07 UIU/ML (ref 0.45–4.5)
VIT B12 SERPL-MCNC: 306 PG/ML (ref 100–900)
WBC # BLD AUTO: 7.09 THOUSAND/UL (ref 4.31–10.16)

## 2022-09-17 PROCEDURE — 36415 COLL VENOUS BLD VENIPUNCTURE: CPT

## 2022-09-17 PROCEDURE — 86300 IMMUNOASSAY TUMOR CA 15-3: CPT

## 2022-09-17 PROCEDURE — 84443 ASSAY THYROID STIM HORMONE: CPT

## 2022-09-17 PROCEDURE — 80061 LIPID PANEL: CPT

## 2022-09-17 PROCEDURE — 82607 VITAMIN B-12: CPT

## 2022-09-17 PROCEDURE — 82306 VITAMIN D 25 HYDROXY: CPT

## 2022-09-17 PROCEDURE — 80053 COMPREHEN METABOLIC PANEL: CPT

## 2022-09-17 PROCEDURE — 85025 COMPLETE CBC W/AUTO DIFF WBC: CPT

## 2022-09-17 PROCEDURE — 83036 HEMOGLOBIN GLYCOSYLATED A1C: CPT

## 2022-09-17 NOTE — ASSESSMENT & PLAN NOTE
Blood pressure is well controlled      Continue regimen of amlodipine 10 mg daily, atenolol 50 mg daily and ramipril 5 mg daily

## 2022-09-17 NOTE — ASSESSMENT & PLAN NOTE
Lab Results   Component Value Date    HGBA1C 8 1 (H) 09/17/2022     Proceed with blood work  Patient is on regimen of sulfonylurea, Jardiance and metformin      Establish plan of treatment based on lab results

## 2022-09-17 NOTE — ASSESSMENT & PLAN NOTE
Pending bilateral diagnostic mammogram, left breast lump appears suspicious for malignancy  Orders for left breast biopsy placed

## 2022-09-17 NOTE — ASSESSMENT & PLAN NOTE
Patient has been using sleep aids, no daily antidepressant  Proceed with labs, I will discuss start of antidepressant with patient and her son within next few days

## 2022-09-18 ENCOUNTER — TELEPHONE (OUTPATIENT)
Dept: FAMILY MEDICINE CLINIC | Facility: CLINIC | Age: 82
End: 2022-09-18

## 2022-09-18 DIAGNOSIS — E11.9 TYPE 2 DIABETES MELLITUS WITHOUT COMPLICATION, WITHOUT LONG-TERM CURRENT USE OF INSULIN (HCC): Primary | ICD-10-CM

## 2022-09-18 DIAGNOSIS — E78.2 MIXED HYPERLIPIDEMIA: ICD-10-CM

## 2022-09-18 DIAGNOSIS — F41.8 DEPRESSION WITH ANXIETY: ICD-10-CM

## 2022-09-18 NOTE — TELEPHONE ENCOUNTER
I discussed results of patient's blood work with her son        · Hemoglobin A1c is elevated, consider adding Tradjenta 100 mg daily  · I will request clinical pharmacy consult to switch patient from current Rx to formulary sulfonylurea  · Consider trial of citalopram 20 mg daily for treatment of anxiety and depression  · I would like to try trazodone Remeron for insomnia but would request clinical pharmacist consult to wean patient off current sleep aids  · Vitamin B12 level is on the lower side, patient will start supplements  · Patient should start Lipitor 20 mg daily due to hyperlipidemia history of CVA

## 2022-09-19 NOTE — PROGRESS NOTES
Post procedure call completed, spoke to son Dr Abhay Carey    Bleeding: _____yes __X___no    Pain: _____yes ___X___no    Redness/Swelling: ______yes ___X___no    Pt with no questions at this time, adv will call when results available, adv to call with any questions or concerns, has name/# for contact

## 2022-09-20 DIAGNOSIS — C50.512 MALIGNANT NEOPLASM OF LOWER-OUTER QUADRANT OF LEFT FEMALE BREAST, UNSPECIFIED ESTROGEN RECEPTOR STATUS (HCC): Primary | ICD-10-CM

## 2022-09-20 PROCEDURE — 88341 IMHCHEM/IMCYTCHM EA ADD ANTB: CPT | Performed by: PATHOLOGY

## 2022-09-20 PROCEDURE — 88305 TISSUE EXAM BY PATHOLOGIST: CPT | Performed by: PATHOLOGY

## 2022-09-20 PROCEDURE — 88342 IMHCHEM/IMCYTCHM 1ST ANTB: CPT | Performed by: PATHOLOGY

## 2022-09-20 RX ORDER — CITALOPRAM 20 MG/1
TABLET ORAL
Qty: 90 TABLET | Refills: 1 | Status: SHIPPED | OUTPATIENT
Start: 2022-09-20

## 2022-09-20 RX ORDER — LINAGLIPTIN 5 MG/1
5 TABLET, FILM COATED ORAL DAILY
Qty: 90 TABLET | Refills: 1 | Status: SHIPPED | OUTPATIENT
Start: 2022-09-20

## 2022-09-20 RX ORDER — ATORVASTATIN CALCIUM 20 MG/1
20 TABLET, FILM COATED ORAL DAILY
Qty: 90 TABLET | Refills: 1 | Status: SHIPPED | OUTPATIENT
Start: 2022-09-20

## 2022-09-20 NOTE — TELEPHONE ENCOUNTER
I spoke with patient's son, Beverly Ramirez, regarding medications  Patient will start Tradjenta 5 mg daily, Celexa 10 mg daily for 6 days increasing dose to 20 mg daily afterwards on Lipitor 20 mg daily  I will discuss gradual weaning of from DIMENHYDRINATE and Cinnarizine that patient has been using for sleep and dizziness      I will request clinical pharmacist consult to readjust dose of Gliclazide to formulary sulfonurea

## 2022-09-21 ENCOUNTER — TELEPHONE (OUTPATIENT)
Dept: HEMATOLOGY ONCOLOGY | Facility: CLINIC | Age: 82
End: 2022-09-21

## 2022-09-21 DIAGNOSIS — E11.9 TYPE 2 DIABETES MELLITUS WITHOUT COMPLICATION, WITHOUT LONG-TERM CURRENT USE OF INSULIN (HCC): Primary | ICD-10-CM

## 2022-09-21 NOTE — TELEPHONE ENCOUNTER
Lyly Coley,    Please see my previous notes  Question about sleep aid and sulfonylurea      Thank you

## 2022-09-21 NOTE — TELEPHONE ENCOUNTER
[address] [facility]  Pratik Gallo, PharmD, BCPS, BCACP     Communication with patient: per telephone  Spoke with patient's son, as per communication form  Reason for documentation: per PCP consule  Recommendations, please consider the following, if in agreeance:  1  Discussed tapering cinnarizine and dimenhydrinate with son  Recommend decreasing by 25-50% over a minimum of 2 weeks  Would prefer 4 week taper but may be limited based on patient's current supply  Instructed son to achieve taper by splitting tablets in half and/or dosing every other day as needed  He verbalized understanding    2  Patient currently taking gliclazide, a sulfonylurea available internationally  Will convert patient to glipizide XL  · Will pend Rx for PCP signature/concurrence       Findings:      Patient has been taking cinnarizine 15 mgand dimenhydrinate 50 mgfor dizziness and sleep  Both are antihistamines (block H1 receptors) available internationally  Family is concerned about daytime somnolence and unsteadiness, which are common adverse effects       Type 2 diabetes mellitus     Current DM Regimen:   Empagliflozin/metformin 12 5/500 mg 1 tab BID   Tradjenta 5 mg daily (recently started by PCP)   Gliclazide     Lab Results   Component Value Date    HGBA1C 8 1 (H) 09/17/2022     Pharmacist Tracking Tool  Reason For Outreach: Embedded Pharmacist  Demographics:  Intervention Method: Phone  Type of Intervention: New  Topics Addressed: Diabetes and Other  Pharmacologic Interventions: Medication Discontinuation, Medication Conversion and Prevent or Manage MERRITT  Non-Pharmacologic Interventions: N/A  Time:  Direct Patient Care: 15 mins  Care Coordination: 20 mins  Recommendation Recipient: Patient/Caregiver and Provider  Outcome: Accepted

## 2022-09-21 NOTE — TELEPHONE ENCOUNTER
Made attempt to schedule a new patient appointment,  Patients son stating that he spoke with Dr Suri De and is waiting for him to call back to schedule a appointment  I offer to schedule the patient he decline advise he will wait for the doctor to ligia him back

## 2022-09-23 ENCOUNTER — HOSPITAL ENCOUNTER (OUTPATIENT)
Dept: CT IMAGING | Facility: HOSPITAL | Age: 82
Discharge: HOME | End: 2022-09-23
Attending: SURGERY
Payer: MEDICARE

## 2022-09-23 ENCOUNTER — TELEPHONE (OUTPATIENT)
Dept: SURGICAL ONCOLOGY | Facility: CLINIC | Age: 82
End: 2022-09-23

## 2022-09-23 DIAGNOSIS — Z17.0 MALIGNANT NEOPLASM OF OVERLAPPING SITES OF LEFT BREAST IN FEMALE, ESTROGEN RECEPTOR POSITIVE (HCC): ICD-10-CM

## 2022-09-23 DIAGNOSIS — C50.812 MALIGNANT NEOPLASM OF OVERLAPPING SITES OF LEFT BREAST IN FEMALE, ESTROGEN RECEPTOR POSITIVE (HCC): Primary | ICD-10-CM

## 2022-09-23 DIAGNOSIS — Z17.0 MALIGNANT NEOPLASM OF OVERLAPPING SITES OF LEFT BREAST IN FEMALE, ESTROGEN RECEPTOR POSITIVE (HCC): Primary | ICD-10-CM

## 2022-09-23 DIAGNOSIS — C77.3 CARCINOMA OF LEFT BREAST METASTATIC TO AXILLARY LYMPH NODE (HCC): ICD-10-CM

## 2022-09-23 DIAGNOSIS — C50.812 MALIGNANT NEOPLASM OF OVERLAPPING SITES OF LEFT BREAST IN FEMALE, ESTROGEN RECEPTOR POSITIVE (HCC): ICD-10-CM

## 2022-09-23 DIAGNOSIS — C50.912 CARCINOMA OF LEFT BREAST METASTATIC TO AXILLARY LYMPH NODE (HCC): ICD-10-CM

## 2022-09-23 PROCEDURE — 71250 CT THORAX DX C-: CPT

## 2022-09-23 PROCEDURE — 74176 CT ABD & PELVIS W/O CONTRAST: CPT

## 2022-09-23 PROCEDURE — G1004 CDSM NDSC: HCPCS

## 2022-09-23 RX ORDER — GLIPIZIDE 5 MG/1
5 TABLET, FILM COATED, EXTENDED RELEASE ORAL DAILY
Qty: 90 TABLET | Refills: 1 | Status: SHIPPED | OUTPATIENT
Start: 2022-09-23

## 2022-09-23 NOTE — TELEPHONE ENCOUNTER
Called patient's son after reviewing case with Dr Bee Dias is recommending a staging workup with a STAT CT and bone scan prior to the patient's consult appointment next week  This was explained to the son who is in agreement  Called central scheduling and coordinated the studies  Called the patient's son back who verbalized understanding of appointment details

## 2022-09-28 ENCOUNTER — PATIENT OUTREACH (OUTPATIENT)
Dept: HEMATOLOGY ONCOLOGY | Facility: CLINIC | Age: 82
End: 2022-09-28

## 2022-09-28 NOTE — PROGRESS NOTES
Received pts information vie tumor registry report  Patients name added to tracker and team notified

## 2022-09-29 PROBLEM — C50.112 MALIGNANT NEOPLASM OF CENTRAL PORTION OF LEFT BREAST IN FEMALE, ESTROGEN RECEPTOR POSITIVE (HCC): Status: ACTIVE | Noted: 2022-09-17

## 2022-09-29 PROBLEM — C50.912 CARCINOMA OF LEFT BREAST METASTATIC TO AXILLARY LYMPH NODE (HCC): Status: ACTIVE | Noted: 2022-09-29

## 2022-09-29 PROBLEM — Z17.0 MALIGNANT NEOPLASM OF CENTRAL PORTION OF LEFT BREAST IN FEMALE, ESTROGEN RECEPTOR POSITIVE (HCC): Status: ACTIVE | Noted: 2022-09-17

## 2022-09-29 PROBLEM — C77.3 CARCINOMA OF LEFT BREAST METASTATIC TO AXILLARY LYMPH NODE (HCC): Status: ACTIVE | Noted: 2022-09-29

## 2022-09-30 ENCOUNTER — PATIENT OUTREACH (OUTPATIENT)
Dept: HEMATOLOGY ONCOLOGY | Facility: CLINIC | Age: 82
End: 2022-09-30

## 2022-09-30 ENCOUNTER — PATIENT OUTREACH (OUTPATIENT)
Dept: CASE MANAGEMENT | Facility: OTHER | Age: 82
End: 2022-09-30

## 2022-09-30 ENCOUNTER — CONSULT (OUTPATIENT)
Dept: SURGICAL ONCOLOGY | Facility: CLINIC | Age: 82
End: 2022-09-30
Payer: COMMERCIAL

## 2022-09-30 VITALS
SYSTOLIC BLOOD PRESSURE: 158 MMHG | HEART RATE: 70 BPM | RESPIRATION RATE: 16 BRPM | BODY MASS INDEX: 25.32 KG/M2 | OXYGEN SATURATION: 98 % | TEMPERATURE: 98 F | WEIGHT: 129 LBS | HEIGHT: 60 IN | DIASTOLIC BLOOD PRESSURE: 70 MMHG

## 2022-09-30 DIAGNOSIS — C50.512 MALIGNANT NEOPLASM OF LOWER-OUTER QUADRANT OF LEFT FEMALE BREAST, UNSPECIFIED ESTROGEN RECEPTOR STATUS (HCC): ICD-10-CM

## 2022-09-30 DIAGNOSIS — Z01.818 PREOPERATIVE TESTING: ICD-10-CM

## 2022-09-30 DIAGNOSIS — C50.912 CARCINOMA OF LEFT BREAST METASTATIC TO AXILLARY LYMPH NODE (HCC): ICD-10-CM

## 2022-09-30 DIAGNOSIS — Z17.0 MALIGNANT NEOPLASM OF CENTRAL PORTION OF LEFT BREAST IN FEMALE, ESTROGEN RECEPTOR POSITIVE (HCC): Primary | ICD-10-CM

## 2022-09-30 DIAGNOSIS — C50.112 MALIGNANT NEOPLASM OF CENTRAL PORTION OF LEFT BREAST IN FEMALE, ESTROGEN RECEPTOR POSITIVE (HCC): Primary | ICD-10-CM

## 2022-09-30 DIAGNOSIS — C77.3 CARCINOMA OF LEFT BREAST METASTATIC TO AXILLARY LYMPH NODE (HCC): ICD-10-CM

## 2022-09-30 PROCEDURE — 99245 OFF/OP CONSLTJ NEW/EST HI 55: CPT | Performed by: SURGERY

## 2022-09-30 RX ORDER — OXYCODONE HYDROCHLORIDE AND ACETAMINOPHEN 5; 325 MG/1; MG/1
1 TABLET ORAL EVERY 6 HOURS PRN
Qty: 20 TABLET | Refills: 0 | Status: ON HOLD | OUTPATIENT
Start: 2022-09-30 | End: 2022-10-15

## 2022-09-30 NOTE — PROGRESS NOTES
Breast Oncology Nurse Navigator    Attended patient's surgical oncology consult with Dr Raiford Shone this morning  Son Stephany Leonel was aware that I would be present  Patient speaks Estonian  Son stayed throughout consult and was able to translate for staff  Patient denied questions  Gave son my business card  He knows to reach out with any questions  General assessment completed

## 2022-09-30 NOTE — PROGRESS NOTES
Surgical Oncology Consult Note       [address] [address]  [facility] [place]  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 and spans a region of 7 point side cm from the nipple posterior laterally  She also has at least 3 suspicious lymph nodes 1 of which was biopsied and shown to have metastatic disease  The patient only speaks area buttock and is accompanied by her son (Dr Krystle Paz)  The patient has indeterminate findings in the right breast contrast enhanced mammography was suggested with possible 2 site biopsy  This is not been done  The patient has had a CT scan chest abdomen pelvis which demonstrated multiple small 1-2 mm nodules most likely benign and a follow-up CT was suggested  Patient has been on Femara for approximately 1 month  According to her son the tumor has decreased in size considerably  After discussion with the patient and the son the patient prefers to have a bilateral mastectomy as opposed to go through additional imaging/possible biopsies on the right side  Patient has a history of a stroke and is on aspirin, we will have her get medical clearance  Oncology History:     Oncology History   Malignant neoplasm of central portion of left breast in female, estrogen receptor positive ([facility] [facility] )   9/16/2022 Biopsy    Left breast ultrasound-guided biopsy  3 o'clock, 10 cm from nipple  A   Invasive carcinoma of no special type (ductal)  Grade 2  ER 90; CT 55; HER2 2+, FISH negative; Ki-67 >10<20  Lymphovascular invasion not identified    B  Left axillary lymph node biopsy  Adenocarcinoma, morphologically consistent with breast primary    Concordant  Malignancy appears multifocal; spans 7 5 cm on mammo  Largest individual mass measures 3 cm on US  Biopsy-proven axillary level 1 metastatic adenopathy; 3 enlarged axillary LN on US  ROBBIE  reflectors placed at both biopsy sites  Right breast indeterminate findings; additional imaging and possible biopsies recommended based on surgical plan/discussion  History of Present Illness: This is a 27-year-old Uzbek speaking woman who appreciated a mass in her left breast   Her son who is a physician also appreciated adenopathy  She had a bilateral diagnostic mammogram and ultrasound  On the right side there were 2 indeterminate areas for which a contrast enhanced mammogram and possible biopsies were recommended  On the left side there was a 3 cm mass at the 3 o'clock position 10 cm from the nipple as well as multiple calcifications with suspicious findings extending towards the nipple spanned an area of approximately 7 5 cm  She also had suspicious adenopathy with at least 3 ultrasound suspicious lymph nodes  The lymph node and the primary mass were biopsied and ROBBIE  clips were placed at each site  The tumor was grade 2 ER 90% ID 55% HER2 negative  Her Ki 67 was 10-20  She had no lymphovascular invasion  Her lymph node was positive  She has had a CT scan which demonstrated no Mets but she did have multiple 1-2 mm nodules for which follow-up CT was recommended  Patient presents now with her son for an opinion regarding further management  They have previously discussed management of the right breast and prefer removing the breast as opposed to undergoing additional imaging and biopsies  The patient her family are interested in genetic testing  Review of Systems:   Review of Systems   Constitutional: Negative for activity change, appetite change and fatigue  HENT: Negative  Eyes: Negative  Respiratory: Negative for cough, shortness of breath and wheezing  Cardiovascular: Negative for chest pain and leg swelling  Gastrointestinal: Negative  Endocrine: Negative  Genitourinary: Negative  Musculoskeletal:        No new changes or complaints of bone pain   Skin: Negative  Allergic/Immunologic: Negative  Neurological: Negative  Hematological: Negative  Psychiatric/Behavioral: Negative  Past Medical History:      Patient Active Problem List   Diagnosis    Hyperlipidemia    [facility]    Depression with anxiety    Benign essential hypertension    Age-related osteoporosis without current pathological fracture    Malignant neoplasm of central portion of left breast in female, estrogen receptor positive ([facility]    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 ([facility] )      No past medical history on file  Past Surgical History:   Procedure Laterality Date    HYSTERECTOMY      US BREAST NEEDLE LOC LEFT Left 9/16/2022    US BREAST NEEDLE LOC RIGHT EACH ADDITIONAL Right 9/16/2022    US GUIDED BREAST BIOPSY LEFT COMPLETE Left 9/16/2022    US GUIDED BREAST LYMPH NODE BIOPSY LEFT Left 9/16/2022        Family History   Problem Relation Age of Onset    Hypertension Mother     No Known Problems Father     Lung cancer Brother         Social History     Socioeconomic History    Marital status:       Spouse name: Not on file    Number of children: Not on file    Years of education: Not on file    Highest education level: Not on file   Occupational History    Not on file   Tobacco Use    Smoking status: Never Smoker    Smokeless tobacco: Never Used   Vaping Use    Vaping Use: Never used   Substance and Sexual Activity    Alcohol use: Not Currently    Drug use: Never    Sexual activity: Not on file   Other Topics Concern    Not on file   Social History Narrative    Not on file     Social Determinants of Health     Financial Resource Strain: Low Risk     Difficulty of Paying Living Expenses: Not hard at all   Food Insecurity: Not on file   Transportation Needs: No Transportation Needs    Lack of Transportation (Medical): No    Lack of Transportation (Non-Medical):  No   Physical Activity: Not on file   Stress: Not on file   Social Connections: Not on file   Intimate Partner Violence: Not on file   Housing Stability: Not on file        Current Outpatient Medications:     amLODIPine (NORVASC) 10 mg tablet, Take 1 tablet by mouth daily, Disp: , Rfl:     ASPIRIN 81 PO, Take 81 mg by mouth 2 (two) times a day, Disp: , Rfl:     atenolol (TENORMIN) 50 mg tablet, Take 50 mg by mouth daily, Disp: , Rfl:     atorvastatin (LIPITOR) 20 mg tablet, Take 1 tablet (20 mg total) by mouth daily, Disp: 90 tablet, Rfl: 1    b complex vitamins capsule, Take 1 capsule by mouth daily Vitamins B1, B6, B12, Disp: , Rfl:     Calcium Carbonate-Vitamin D (CALCIUM-VITAMIN D3 PO), Take 1 tablet by mouth in the morning 500 mg/ 600 iu, Disp: , Rfl:     citalopram (CeleXA) 20 mg tablet, Take 1/2 tablet once a day for 6 days, then take 1 tablet daily, Disp: 90 tablet, Rfl: 1    diclofenac potassium (CATAFLAM) 50 mg tablet, Take 50 mg by mouth daily as needed Arthritis, Disp: , Rfl:     DIMENHYDRINATE PO, Take 50 mg by mouth as needed For Motion sickness, Disp: , Rfl:     DIMENHYDRINATE PO, Take 120 mg by mouth daily at bedtime, Disp: , Rfl:     Empagliflozin-metFORMIN HCl 12 5-1000 MG TABS, Take 1 tablet by mouth 2 (two) times a day , Disp: , Rfl:     glipiZIDE (GLUCOTROL XL) 5 mg 24 hr tablet, Take 1 tablet (5 mg total) by mouth daily, Disp: 90 tablet, Rfl: 1    letrozole (FEMARA) 2 5 mg tablet, Take 2 5 mg by mouth daily, Disp: , Rfl:     linaGLIPtin (Tradjenta) 5 MG TABS, Take 5 mg by mouth daily, Disp: 90 tablet, Rfl: 1    other medication, see sig,, Medication/product name: Cinnarizine Strength: 25 mg Sig (include dose, route, frequency): po qhs, Disp: , Rfl:     other medication, see sig,, Medication/product name: Unicron (Gliclazide) Strength: 30 mg Sig (include dose, route, frequency): PO, BID, Disp: , Rfl:     ramipril (ALTACE) 5 mg capsule, Take 5 mg by mouth daily, Disp: , Rfl:    No Known Allergies    Physical Exam:     Vitals:    09/30/22 0738   BP: 158/70   Pulse: 70   Resp: 16   Temp: 98 °F (36 7 °C)   SpO2: 98%     Physical Exam  Vitals reviewed  Constitutional:       Appearance: She is well-developed  HENT:      Head: Normocephalic and atraumatic  Eyes:      Pupils: Pupils are equal, round, and reactive to light  Neck:      Thyroid: No thyromegaly  Vascular: No JVD  Trachea: No tracheal deviation  Cardiovascular:      Rate and Rhythm: Normal rate and regular rhythm  Heart sounds: Normal heart sounds  No murmur heard  No friction rub  No gallop  Pulmonary:      Effort: Pulmonary effort is normal  No respiratory distress  Breath sounds: Normal breath sounds  No wheezing or rales  Chest:          Comments: The right breast was examined in the sitting and supine position  There are no worrisome skin changes, tenderness, inverted nipple, nipple discharge, swelling, bleeding or evidence of a mass in any quadrant  Declan survey demonstrated no evidence of any clinically suspicious axillary, pectoral or paraclavicular lymph nodes  Examination left breast in both the sitting and supine position demonstrate a dominant mass at the 3 o'clock position in the lateral aspect closer to the chest wall is best appreciated by palpation as the breast approaches the lateral edge  It is mobile  There are no worrisome skin findings  She does have a small palpable lymph node approximately 1 cm which is mobile  Abdominal:      General: There is no distension  Palpations: Abdomen is soft  There is no hepatomegaly or mass  Tenderness: There is no abdominal tenderness   There is no guarding or rebound  Musculoskeletal:         General: No tenderness  Normal range of motion  Cervical back: Normal range of motion and neck supple  Lymphadenopathy:      Cervical: No cervical adenopathy  Skin:     General: Skin is warm and dry  Findings: No erythema or rash  Neurological:      Mental Status: She is alert and oriented to person, place, and time  Cranial Nerves: No cranial nerve deficit  Psychiatric:         Behavior: Behavior normal          Results:   I reviewed her mammogram and ultrasound images as well as her pathology report there concordant  I agree that her tumors most likely multifocal   Discussion/Summary:   Clinically this is a T2 N1 ER positive AL positive HER2 negative grade 2 breast cancer  Given the multifocality of recommended a modified radical mastectomy  The patient I her son discussed the options of additional biopsies on the right side or imaging verses mastectomy  She previously indicated that she wanted a mastectomy on the right side  She reinforces decision again today  They are interested in genetic testing which will coordinate for them  This would not alter her surgical management so this could be done at a delayed time  The patient and I underwent the process of consent for left modified radical mastectomy using a ROBBIE approach (ROBBIE in lymph node and primary breast tumor), left breast lymphatic mapping with blue and radioactive dye, right simple mastectomy  The complications outlined on the consent including relatively minor problems (wound infection, wound healing problems, hematomas, scarring, chronic discomfort/pain), moderate problems (injury to nerves or blood vessels, allergic reactions) and major complications (extensive blood loss requiring transfusions or possible addtional surgeries, cardiac arrest, stroke and possible death)    We also reviewed specific complications as outlined on the consent form including possible cancer recurrence, arm swelling, need for adjuvant therapies and/or additional surgeries  All questions were answered to the patient's satisfaction  We will coordinate the surgery at our next mutual convience  Advance Care Planning/Advance Directives:  I discussed the disease status, treatment plans and follow-up with the patient

## 2022-09-30 NOTE — H&P (VIEW-ONLY)
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 and spans a region of 7 point side cm from the nipple posterior laterally  She also has at least 3 suspicious lymph nodes 1 of which was biopsied and shown to have metastatic disease  The patient only speaks area buttock and is accompanied by her son (Dr Leopoldo Prose)  The patient has indeterminate findings in the right breast contrast enhanced mammography was suggested with possible 2 site biopsy  This is not been done  The patient has had a CT scan chest abdomen pelvis which demonstrated multiple small 1-2 mm nodules most likely benign and a follow-up CT was suggested  Patient has been on Femara for approximately 1 month  According to her son the tumor has decreased in size considerably  After discussion with the patient and the son the patient prefers to have a bilateral mastectomy as opposed to go through additional imaging/possible biopsies on the right side  Patient has a history of a stroke and is on aspirin, we will have her get medical clearance  Oncology History:     Oncology History   Malignant neoplasm of central portion of left breast in female, estrogen receptor positive ([facility] [address] )   9/16/2022 Biopsy    Left breast ultrasound-guided biopsy  3 o'clock, 10 cm from nipple  A   Invasive carcinoma of no special type (ductal)  Grade 2  ER 90; AL 55; HER2 2+, FISH negative; Ki-67 >10<20  Lymphovascular invasion not identified    B  Left axillary lymph node biopsy  Adenocarcinoma, morphologically consistent with breast primary    Concordant  Malignancy appears multifocal; spans 7 5 cm on mammo  Largest individual mass measures 3 cm on US  Biopsy-proven axillary level 1 metastatic adenopathy; 3 enlarged axillary LN on US  ROBBIE  reflectors placed at both biopsy sites  Right breast indeterminate findings; additional imaging and possible biopsies recommended based on surgical plan/discussion  History of Present Illness: This is a 82-year-old Syriac speaking woman who appreciated a mass in her left breast   Her son who is a physician also appreciated adenopathy  She had a bilateral diagnostic mammogram and ultrasound  On the right side there were 2 indeterminate areas for which a contrast enhanced mammogram and possible biopsies were recommended  On the left side there was a 3 cm mass at the 3 o'clock position 10 cm from the nipple as well as multiple calcifications with suspicious findings extending towards the nipple spanned an area of approximately 7 5 cm  She also had suspicious adenopathy with at least 3 ultrasound suspicious lymph nodes  The lymph node and the primary mass were biopsied and ROBBIE  clips were placed at each site  The tumor was grade 2 ER 90% AZ 55% HER2 negative  Her Ki 67 was 10-20  She had no lymphovascular invasion  Her lymph node was positive  She has had a CT scan which demonstrated no Mets but she did have multiple 1-2 mm nodules for which follow-up CT was recommended  Patient presents now with her son for an opinion regarding further management  They have previously discussed management of the right breast and prefer removing the breast as opposed to undergoing additional imaging and biopsies  The patient her family are interested in genetic testing  Review of Systems:   Review of Systems   Constitutional: Negative for activity change, appetite change and fatigue  HENT: Negative  Eyes: Negative  Respiratory: Negative for cough, shortness of breath and wheezing  Cardiovascular: Negative for chest pain and leg swelling  Gastrointestinal: Negative  Endocrine: Negative  Genitourinary: Negative  Musculoskeletal:        No new changes or complaints of bone pain   Skin: Negative  Allergic/Immunologic: Negative  Neurological: Negative  Hematological: Negative  Psychiatric/Behavioral: Negative  Past Medical History:      Patient Active Problem List   Diagnosis   • Hyperlipidemia   • Type 2 diabetes mellitus ([facility] [address] )   • Depression with anxiety   • Benign essential hypertension   • Age-related osteoporosis without current pathological fracture   • Malignant neoplasm of central portion of left breast in female, estrogen receptor positive ([facility] [address] )   • 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      No past medical history on file  Past Surgical History:   Procedure Laterality Date   • HYSTERECTOMY     • US BREAST NEEDLE LOC LEFT Left 9/16/2022   • US BREAST NEEDLE LOC RIGHT EACH ADDITIONAL Right 9/16/2022   • US GUIDED BREAST BIOPSY LEFT COMPLETE Left 9/16/2022   • US GUIDED BREAST LYMPH NODE BIOPSY LEFT Left 9/16/2022        Family History   Problem Relation Age of Onset   • Hypertension Mother    • No Known Problems Father    • Lung cancer Brother         Social History     Socioeconomic History   • Marital status:       Spouse name: Not on file   • Number of children: Not on file   • Years of education: Not on file   • Highest education level: Not on file   Occupational History   • Not on file   Tobacco Use   • Smoking status: Never Smoker   • Smokeless tobacco: Never Used   Vaping Use   • Vaping Use: Never used   Substance and Sexual Activity   • Alcohol use: Not Currently   • Drug use: Never   • Sexual activity: Not on file   Other Topics Concern   • Not on file   Social History Narrative   • Not on file     Social Determinants of Health     Financial Resource Strain: Low Risk    • Difficulty of Paying Living Expenses: Not hard at all   Food Insecurity: Not on file   Transportation Needs: No Transportation Needs   • Lack of Transportation (Medical): No   • Lack of Transportation (Non-Medical):  No   Physical Activity: Not on file   Stress: Not on file   Social Connections: Not on file   Intimate Partner Violence: Not on file   Housing Stability: Not on file        Current Outpatient Medications:   •  amLODIPine (NORVASC) 10 mg tablet, Take 1 tablet by mouth daily, Disp: , Rfl:   •  ASPIRIN 81 PO, Take 81 mg by mouth 2 (two) times a day, Disp: , Rfl:   •  atenolol (TENORMIN) 50 mg tablet, Take 50 mg by mouth daily, Disp: , Rfl:   •  atorvastatin (LIPITOR) 20 mg tablet, Take 1 tablet (20 mg total) by mouth daily, Disp: 90 tablet, Rfl: 1  •  b complex vitamins capsule, Take 1 capsule by mouth daily Vitamins B1, B6, B12, Disp: , Rfl:   •  Calcium Carbonate-Vitamin D (CALCIUM-VITAMIN D3 PO), Take 1 tablet by mouth in the morning 500 mg/ 600 iu, Disp: , Rfl:   •  citalopram (CeleXA) 20 mg tablet, Take 1/2 tablet once a day for 6 days, then take 1 tablet daily, Disp: 90 tablet, Rfl: 1  •  diclofenac potassium (CATAFLAM) 50 mg tablet, Take 50 mg by mouth daily as needed Arthritis, Disp: , Rfl:   •  DIMENHYDRINATE PO, Take 50 mg by mouth as needed For Motion sickness, Disp: , Rfl:   •  DIMENHYDRINATE PO, Take 120 mg by mouth daily at bedtime, Disp: , Rfl:   •  Empagliflozin-metFORMIN HCl 12 5-1000 MG TABS, Take 1 tablet by mouth 2 (two) times a day , Disp: , Rfl:   •  glipiZIDE (GLUCOTROL XL) 5 mg 24 hr tablet, Take 1 tablet (5 mg total) by mouth daily, Disp: 90 tablet, Rfl: 1  •  letrozole (FEMARA) 2 5 mg tablet, Take 2 5 mg by mouth daily, Disp: , Rfl:   •  linaGLIPtin (Tradjenta) 5 MG TABS, Take 5 mg by mouth daily, Disp: 90 tablet, Rfl: 1  •  other medication, see sig,, Medication/product name: Cinnarizine Strength: 25 mg Sig (include dose, route, frequency): po qhs, Disp: , Rfl:   •  other medication, see sig,, Medication/product name: Unicron (Gliclazide) Strength: 30 mg Sig (include dose, route, frequency): PO, BID, Disp: , Rfl:   •  ramipril (ALTACE) 5 mg capsule, Take 5 mg by mouth daily, Disp: , Rfl:    No Known Allergies    Physical Exam:     Vitals:    09/30/22 0738   BP: 158/70   Pulse: 70   Resp: 16   Temp: 98 °F (36 7 °C)   SpO2: 98%     Physical Exam  Vitals reviewed  Constitutional:       Appearance: She is well-developed  HENT:      Head: Normocephalic and atraumatic  Eyes:      Pupils: Pupils are equal, round, and reactive to light  Neck:      Thyroid: No thyromegaly  Vascular: No JVD  Trachea: No tracheal deviation  Cardiovascular:      Rate and Rhythm: Normal rate and regular rhythm  Heart sounds: Normal heart sounds  No murmur heard  No friction rub  No gallop  Pulmonary:      Effort: Pulmonary effort is normal  No respiratory distress  Breath sounds: Normal breath sounds  No wheezing or rales  Chest:          Comments: The right breast was examined in the sitting and supine position  There are no worrisome skin changes, tenderness, inverted nipple, nipple discharge, swelling, bleeding or evidence of a mass in any quadrant  Declan survey demonstrated no evidence of any clinically suspicious axillary, pectoral or paraclavicular lymph nodes  Examination left breast in both the sitting and supine position demonstrate a dominant mass at the 3 o'clock position in the lateral aspect closer to the chest wall is best appreciated by palpation as the breast approaches the lateral edge  It is mobile  There are no worrisome skin findings  She does have a small palpable lymph node approximately 1 cm which is mobile  Abdominal:      General: There is no distension  Palpations: Abdomen is soft  There is no hepatomegaly or mass  Tenderness: There is no abdominal tenderness   There is no guarding or rebound  Musculoskeletal:         General: No tenderness  Normal range of motion  Cervical back: Normal range of motion and neck supple  Lymphadenopathy:      Cervical: No cervical adenopathy  Skin:     General: Skin is warm and dry  Findings: No erythema or rash  Neurological:      Mental Status: She is alert and oriented to person, place, and time  Cranial Nerves: No cranial nerve deficit  Psychiatric:         Behavior: Behavior normal          Results:   I reviewed her mammogram and ultrasound images as well as her pathology report there concordant  I agree that her tumors most likely multifocal   Discussion/Summary:   Clinically this is a T2 N1 ER positive ND positive HER2 negative grade 2 breast cancer  Given the multifocality of recommended a modified radical mastectomy  The patient I her son discussed the options of additional biopsies on the right side or imaging verses mastectomy  She previously indicated that she wanted a mastectomy on the right side  She reinforces decision again today  They are interested in genetic testing which will coordinate for them  This would not alter her surgical management so this could be done at a delayed time  The patient and I underwent the process of consent for left modified radical mastectomy using a ROBBIE approach (ROBBIE in lymph node and primary breast tumor), left breast lymphatic mapping with blue and radioactive dye, right simple mastectomy  The complications outlined on the consent including relatively minor problems (wound infection, wound healing problems, hematomas, scarring, chronic discomfort/pain), moderate problems (injury to nerves or blood vessels, allergic reactions) and major complications (extensive blood loss requiring transfusions or possible addtional surgeries, cardiac arrest, stroke and possible death)    We also reviewed specific complications as outlined on the consent form including possible cancer recurrence, arm swelling, need for adjuvant therapies and/or additional surgeries  All questions were answered to the patient's satisfaction  We will coordinate the surgery at our next mutual convience  Advance Care Planning/Advance Directives:  I discussed the disease status, treatment plans and follow-up with the patient

## 2022-10-04 ENCOUNTER — HOSPITAL ENCOUNTER (OUTPATIENT)
Dept: NON INVASIVE DIAGNOSTICS | Facility: CLINIC | Age: 82
Discharge: HOME | End: 2022-10-04
Payer: COMMERCIAL

## 2022-10-04 ENCOUNTER — PATIENT OUTREACH (OUTPATIENT)
Dept: CASE MANAGEMENT | Facility: OTHER | Age: 82
End: 2022-10-04

## 2022-10-04 ENCOUNTER — OFFICE VISIT (OUTPATIENT)
Dept: LAB | Facility: CLINIC | Age: 82
End: 2022-10-04
Payer: COMMERCIAL

## 2022-10-04 VITALS
HEART RATE: 70 BPM | HEIGHT: 60 IN | BODY MASS INDEX: 25.32 KG/M2 | SYSTOLIC BLOOD PRESSURE: 158 MMHG | WEIGHT: 129 LBS | DIASTOLIC BLOOD PRESSURE: 70 MMHG

## 2022-10-04 DIAGNOSIS — C77.3 CARCINOMA OF LEFT BREAST METASTATIC TO AXILLARY LYMPH NODE (HCC): ICD-10-CM

## 2022-10-04 DIAGNOSIS — R01.1 CARDIAC MURMUR: ICD-10-CM

## 2022-10-04 DIAGNOSIS — Z17.0 MALIGNANT NEOPLASM OF CENTRAL PORTION OF LEFT BREAST IN FEMALE, ESTROGEN RECEPTOR POSITIVE (HCC): ICD-10-CM

## 2022-10-04 DIAGNOSIS — C50.912 CARCINOMA OF LEFT BREAST METASTATIC TO AXILLARY LYMPH NODE (HCC): ICD-10-CM

## 2022-10-04 DIAGNOSIS — Z01.818 PREOPERATIVE TESTING: ICD-10-CM

## 2022-10-04 DIAGNOSIS — C50.112 MALIGNANT NEOPLASM OF CENTRAL PORTION OF LEFT BREAST IN FEMALE, ESTROGEN RECEPTOR POSITIVE (HCC): ICD-10-CM

## 2022-10-04 LAB
AORTIC ROOT: 2.5 CM
APICAL FOUR CHAMBER EJECTION FRACTION: 62 %
ASCENDING AORTA: 2.9 CM
E WAVE DECELERATION TIME: 215 MS
FRACTIONAL SHORTENING: 28 % (ref 28–44)
INTERVENTRICULAR SEPTUM IN DIASTOLE (PARASTERNAL SHORT AXIS VIEW): 1.4 CM
INTERVENTRICULAR SEPTUM: 1.4 CM (ref 0.6–1.1)
LAAS-AP2: 13.5 CM2
LAAS-AP4: 13 CM2
LEFT ATRIUM SIZE: 3.3 CM
LEFT INTERNAL DIMENSION IN SYSTOLE: 2.1 CM (ref 2.1–4)
LEFT VENTRICULAR INTERNAL DIMENSION IN DIASTOLE: 2.9 CM (ref 3.5–6)
LEFT VENTRICULAR POSTERIOR WALL IN END DIASTOLE: 1.1 CM
LEFT VENTRICULAR STROKE VOLUME: 19 ML
LVSV (TEICH): 19 ML
MV E'TISSUE VEL-SEP: 6 CM/S
MV PEAK A VEL: 0.65 M/S
MV PEAK E VEL: 76 CM/S
MV STENOSIS PRESSURE HALF TIME: 62 MS
MV VALVE AREA P 1/2 METHOD: 3.55 CM2
RIGHT ATRIUM AREA SYSTOLE A4C: 11.2 CM2
RIGHT VENTRICLE ID DIMENSION: 3.2 CM
SL CV LEFT ATRIUM LENGTH A2C: 4.3 CM
SL CV LV EF: 60
SL CV PED ECHO LEFT VENTRICLE DIASTOLIC VOLUME (MOD BIPLANE) 2D: 32 ML
SL CV PED ECHO LEFT VENTRICLE SYSTOLIC VOLUME (MOD BIPLANE) 2D: 14 ML
TR MAX PG: 21 MMHG
TR PEAK VELOCITY: 2.3 M/S
TRICUSPID VALVE PEAK REGURGITATION VELOCITY: 2.28 M/S

## 2022-10-04 PROCEDURE — 93306 TTE W/DOPPLER COMPLETE: CPT

## 2022-10-04 PROCEDURE — 93306 TTE W/DOPPLER COMPLETE: CPT | Performed by: INTERNAL MEDICINE

## 2022-10-04 PROCEDURE — 93005 ELECTROCARDIOGRAM TRACING: CPT

## 2022-10-04 NOTE — PROGRESS NOTES
OSW emailed pt's son, Dr Morinman Holley reaching out to him and explaining this writer will be his mother's OSW  Offered support and provided direct contact information  Will continue to follow

## 2022-10-05 LAB
ATRIAL RATE: 57 BPM
P AXIS: 30 DEGREES
PR INTERVAL: 226 MS
QRS AXIS: -6 DEGREES
QRSD INTERVAL: 80 MS
QT INTERVAL: 430 MS
QTC INTERVAL: 418 MS
T WAVE AXIS: 81 DEGREES
VENTRICULAR RATE: 57 BPM

## 2022-10-05 PROCEDURE — 93010 ELECTROCARDIOGRAM REPORT: CPT | Performed by: INTERNAL MEDICINE

## 2022-10-06 ENCOUNTER — ANESTHESIA EVENT (OUTPATIENT)
Dept: PERIOP | Facility: HOSPITAL | Age: 82
End: 2022-10-06
Payer: COMMERCIAL

## 2022-10-07 ENCOUNTER — TELEPHONE (OUTPATIENT)
Dept: FAMILY MEDICINE CLINIC | Facility: CLINIC | Age: 82
End: 2022-10-07

## 2022-10-07 NOTE — TELEPHONE ENCOUNTER
This is the message sent by [facility] nurse Melvin Rahman with info on clearing pt for her upcoming surgery  Please see below:    [11:42 AM] Yeny Small, thats fine  Her echocardiogram has no clinically significant abnormalities  Her ekg showed sinus bradycardia with a 1st degree AV block, not significantly changed from her ECG in 2014  that is from Dr Perez Colón    Thank you

## 2022-10-10 ENCOUNTER — HOME HEALTH ADMISSION (OUTPATIENT)
Dept: HOME HEALTH SERVICES | Facility: HOME HEALTHCARE | Age: 82
End: 2022-10-10
Payer: COMMERCIAL

## 2022-10-10 ENCOUNTER — PATIENT OUTREACH (OUTPATIENT)
Dept: CASE MANAGEMENT | Facility: OTHER | Age: 82
End: 2022-10-10

## 2022-10-10 ENCOUNTER — DOCUMENTATION (OUTPATIENT)
Dept: HEMATOLOGY ONCOLOGY | Facility: CLINIC | Age: 82
End: 2022-10-10

## 2022-10-10 DIAGNOSIS — C77.3 CARCINOMA OF LEFT BREAST METASTATIC TO AXILLARY LYMPH NODE (HCC): ICD-10-CM

## 2022-10-10 DIAGNOSIS — C50.112 MALIGNANT NEOPLASM OF CENTRAL PORTION OF LEFT BREAST IN FEMALE, ESTROGEN RECEPTOR POSITIVE: ICD-10-CM

## 2022-10-10 DIAGNOSIS — Z17.0 MALIGNANT NEOPLASM OF CENTRAL PORTION OF LEFT BREAST IN FEMALE, ESTROGEN RECEPTOR POSITIVE: ICD-10-CM

## 2022-10-10 DIAGNOSIS — Z01.818 PREOPERATIVE TESTING: ICD-10-CM

## 2022-10-10 DIAGNOSIS — C50.912 CARCINOMA OF LEFT BREAST METASTATIC TO AXILLARY LYMPH NODE (HCC): ICD-10-CM

## 2022-10-10 PROCEDURE — U0003 INFECTIOUS AGENT DETECTION BY NUCLEIC ACID (DNA OR RNA); SEVERE ACUTE RESPIRATORY SYNDROME CORONAVIRUS 2 (SARS-COV-2) (CORONAVIRUS DISEASE [COVID-19]), AMPLIFIED PROBE TECHNIQUE, MAKING USE OF HIGH THROUGHPUT TECHNOLOGIES AS DESCRIBED BY CMS-2020-01-R: HCPCS | Performed by: SURGERY

## 2022-10-10 PROCEDURE — U0005 INFEC AGEN DETEC AMPLI PROBE: HCPCS | Performed by: SURGERY

## 2022-10-10 NOTE — PROGRESS NOTES
Received email from [address] [place] [address] to verify the pts medicaid status  Checked promise & pt has active medical assistance under recipient id# [id number] effective 09/23/22  Emailed this info back to [address] [place] [address]

## 2022-10-10 NOTE — PROGRESS NOTES
Biopsychosocial and Barriers Assessment    Cancer Diagnosis: L Breast Cancer  Home/Cell Phone: [phone]  Emergency Contact: Dr Garcia July (son) [phone]  Marital Status:   Interpretation concerns, speaks another language, preferred language: Speaks Tajik  Cultural concerns: none identified  Ability to read or write: in Tajik    Caregiver/Support: son and family  Children: 1 son, grandchildren  Child/Elder care: none    Housing: Home  Home Setup: multistory home  Lives With: son and family  Daily Living Activities: independent  Durable Medical Equipment: none needed at this time  Ambulation: independent    Preferred Pharmacy: [email or web address] co-pays with insurance: None-approved for Medical Assistance  High co-pays with medication coverage: N/A  No medication coverage: N/A    Primary Care Provider: Taty Olvera MD  Hx of Home Health Care: none  Hx of Short term rehab: none  Mental Health Hx: H/O Anxiety and depression, recently started Celexa  Substance Abuse Hx: N/A  Employment: not employed  [place] Status/Location:  N/A  Ability to pay bills: yes  POA/LW/AD: Not addressed  Transportation Plan/Concerns: family will assist      What do you know about your Cancer Diagnosis    What has your doctor told you about your cancer diagnosis: S/W son, pt aware she has breast cancer in L breast  She appreciated a mass there  What has your doctor told you about your cancer treatment: Per son, confirmed bilateral mastectomies scheduled 10/14    What specific concerns do you have about your diagnosis and treatment: none at this time  Have you been made aware of any hair loss associated with treatment: N/A    Additional Comments:    OSW spoke with son, Dr Kenya Phipps to offer support  Per Dr Kenya Phipps, his mother is coping appropriately with diagnosis  He and his family are providing much support to her as needed  He expressed concern about her pending Medical Assistance status   She is scheduled for bilateral mastectomies this Friday, 10/14  OSW discussed possible need for VNA for drain care after her surgery  Dr Yehuda Turcios expressed appreciation for outreach  OSW spoke with Jumana Singh, oncology finance counselor regarding MA status  Pt is active and ID number has been noted  Spoke with Dr Yehuda Turcios and updated him of his mother's MA approval  Explained his mother will need to pick a plan from [facility] ([address] vs [facility])  He understood same  Will continue to follow and provide support

## 2022-10-11 LAB — SARS-COV-2 RNA RESP QL NAA+PROBE: NEGATIVE

## 2022-10-11 NOTE — PRE-PROCEDURE INSTRUCTIONS
Pre-Surgery Instructions:   Medication Instructions   • amLODIPine (NORVASC) 10 mg tablet Continue to take as prescribed including DOS with a small sip of water, unless usually taken at night   • ASPIRIN 81 PO Avoid 1 week prior to surgery    • atenolol (TENORMIN) 50 mg tablet Continue to take as prescribed including DOS with a small sip of water, unless usually taken at night   • atorvastatin (LIPITOR) 20 mg tablet Continue to take as prescribed including DOS with a small sip of water, unless usually taken at night   • b complex vitamins capsule Avoid 1 week prior to surgery    • Calcium Carbonate-Vitamin D (CALCIUM-VITAMIN D3 PO) Avoid 1 week prior to surgery    • diclofenac potassium (CATAFLAM) 50 mg tablet Avoid 1 week prior to surgery    • Empagliflozin-metFORMIN HCl 12 5-1000 MG TABS continue as prescribed excluding DOS   • glipiZIDE (GLUCOTROL XL) 5 mg 24 hr tablet continue as prescribed excluding DOS   • letrozole (FEMARA) 2 5 mg tablet Continue to take as prescribed including DOS with a small sip of water, unless usually taken at night   • linaGLIPtin (Tradjenta) 5 MG TABS continue as prescribed excluding DOS   • ramipril (ALTACE) 5 mg capsule continue as prescribed excluding DOS    Avoid non-prescribed ASPIRIN, OTC vitamins and NSAIDS prior to surgery  Tylenol okay PRN  Continue scheduled medications excluding DOS  Avoid smoking prior to Surgery   Avoid alcohol, illicit drugs and marijuana for 24 hours prior to DOS  NPO instructions given: no food, water or anything else by mouth after midnight prior to surgery  Shower the night before and the morning of surgery using CHG wash or antibacterial soap if CHG is not indicated  Avoid shaving for 24 hours prior to DOS  Avoid using lotions, powders, oils, makeup, hair products, etc  DOS  Patient given up to date visitor guidelines    A ride home after surgery is needed- failure to have a ride can result in cancellation  Remove jewelry and not to bring valuables DOS  Dentures and contact lenses will have to be removed for surgery  Patient understands he or she will receive a call the afternoon before surgery regarding an arrival time  Patient verbalized understanding of all instructions

## 2022-10-14 ENCOUNTER — HOSPITAL ENCOUNTER (OUTPATIENT)
Dept: NUCLEAR MEDICINE | Facility: HOSPITAL | Age: 82
Discharge: HOME | End: 2022-10-14
Attending: SURGERY
Payer: COMMERCIAL

## 2022-10-14 ENCOUNTER — HOSPITAL ENCOUNTER (OUTPATIENT)
Facility: HOSPITAL | Age: 82
Setting detail: OUTPATIENT SURGERY
Discharge: HOME HEALTH | End: 2022-10-15
Attending: SURGERY | Admitting: SURGERY
Payer: COMMERCIAL

## 2022-10-14 ENCOUNTER — ANESTHESIA (OUTPATIENT)
Dept: PERIOP | Facility: HOSPITAL | Age: 82
End: 2022-10-14
Payer: COMMERCIAL

## 2022-10-14 DIAGNOSIS — C50.912 CARCINOMA OF LEFT BREAST METASTATIC TO AXILLARY LYMPH NODE (HCC): ICD-10-CM

## 2022-10-14 DIAGNOSIS — C50.112 MALIGNANT NEOPLASM OF CENTRAL PORTION OF LEFT BREAST IN FEMALE, ESTROGEN RECEPTOR POSITIVE (HCC): ICD-10-CM

## 2022-10-14 DIAGNOSIS — C77.3 CARCINOMA OF LEFT BREAST METASTATIC TO AXILLARY LYMPH NODE (HCC): ICD-10-CM

## 2022-10-14 DIAGNOSIS — Z17.0 MALIGNANT NEOPLASM OF CENTRAL PORTION OF LEFT BREAST IN FEMALE, ESTROGEN RECEPTOR POSITIVE (HCC): Primary | ICD-10-CM

## 2022-10-14 DIAGNOSIS — Z17.0 MALIGNANT NEOPLASM OF CENTRAL PORTION OF LEFT BREAST IN FEMALE, ESTROGEN RECEPTOR POSITIVE (HCC): ICD-10-CM

## 2022-10-14 DIAGNOSIS — C50.112 MALIGNANT NEOPLASM OF CENTRAL PORTION OF LEFT BREAST IN FEMALE, ESTROGEN RECEPTOR POSITIVE (HCC): Primary | ICD-10-CM

## 2022-10-14 LAB
GLUCOSE SERPL-MCNC: 187 MG/DL (ref 65–140)
GLUCOSE SERPL-MCNC: 212 MG/DL (ref 65–140)
GLUCOSE SERPL-MCNC: 233 MG/DL (ref 65–140)
GLUCOSE SERPL-MCNC: 353 MG/DL (ref 65–140)
GLUCOSE SERPL-MCNC: 367 MG/DL (ref 65–140)

## 2022-10-14 PROCEDURE — 82948 REAGENT STRIP/BLOOD GLUCOSE: CPT

## 2022-10-14 PROCEDURE — C9290 INJ, BUPIVACAINE LIPOSOME: HCPCS | Performed by: ANESTHESIOLOGY

## 2022-10-14 PROCEDURE — 38792 RA TRACER ID OF SENTINL NODE: CPT

## 2022-10-14 PROCEDURE — A9541 TC99M SULFUR COLLOID: HCPCS

## 2022-10-14 RX ORDER — HYDROMORPHONE HCL/PF 1 MG/ML
0.5 SYRINGE (ML) INJECTION
Status: DISCONTINUED | OUTPATIENT
Start: 2022-10-14 | End: 2022-10-14

## 2022-10-14 RX ORDER — PROPOFOL 10 MG/ML
INJECTION, EMULSION INTRAVENOUS AS NEEDED
Status: DISCONTINUED | OUTPATIENT
Start: 2022-10-14 | End: 2022-10-14

## 2022-10-14 RX ORDER — LETROZOLE 2.5 MG/1
2.5 TABLET, FILM COATED ORAL DAILY
Status: DISCONTINUED | OUTPATIENT
Start: 2022-10-14 | End: 2022-10-15 | Stop reason: HOSPADM

## 2022-10-14 RX ORDER — ATORVASTATIN CALCIUM 20 MG/1
20 TABLET, FILM COATED ORAL DAILY
Status: DISCONTINUED | OUTPATIENT
Start: 2022-10-14 | End: 2022-10-15 | Stop reason: HOSPADM

## 2022-10-14 RX ORDER — INSULIN LISPRO 100 [IU]/ML
1-6 INJECTION, SOLUTION INTRAVENOUS; SUBCUTANEOUS
Status: DISCONTINUED | OUTPATIENT
Start: 2022-10-15 | End: 2022-10-14

## 2022-10-14 RX ORDER — HYDROMORPHONE HCL/PF 1 MG/ML
0.5 SYRINGE (ML) INJECTION
Status: DISCONTINUED | OUTPATIENT
Start: 2022-10-14 | End: 2022-10-14 | Stop reason: HOSPADM

## 2022-10-14 RX ORDER — OXYCODONE HYDROCHLORIDE 5 MG/1
5 TABLET ORAL EVERY 4 HOURS PRN
Status: DISCONTINUED | OUTPATIENT
Start: 2022-10-14 | End: 2022-10-15

## 2022-10-14 RX ORDER — PROPOFOL 10 MG/ML
INJECTION, EMULSION INTRAVENOUS CONTINUOUS PRN
Status: DISCONTINUED | OUTPATIENT
Start: 2022-10-14 | End: 2022-10-14

## 2022-10-14 RX ORDER — HYDROMORPHONE HCL/PF 1 MG/ML
SYRINGE (ML) INJECTION AS NEEDED
Status: DISCONTINUED | OUTPATIENT
Start: 2022-10-14 | End: 2022-10-14

## 2022-10-14 RX ORDER — FENTANYL CITRATE/PF 50 MCG/ML
25 SYRINGE (ML) INJECTION
Status: DISCONTINUED | OUTPATIENT
Start: 2022-10-14 | End: 2022-10-14 | Stop reason: HOSPADM

## 2022-10-14 RX ORDER — LIDOCAINE HYDROCHLORIDE 10 MG/ML
INJECTION, SOLUTION EPIDURAL; INFILTRATION; INTRACAUDAL; PERINEURAL AS NEEDED
Status: DISCONTINUED | OUTPATIENT
Start: 2022-10-14 | End: 2022-10-14

## 2022-10-14 RX ORDER — ONDANSETRON 2 MG/ML
4 INJECTION INTRAMUSCULAR; INTRAVENOUS ONCE AS NEEDED
Status: DISCONTINUED | OUTPATIENT
Start: 2022-10-14 | End: 2022-10-14 | Stop reason: HOSPADM

## 2022-10-14 RX ORDER — INSULIN LISPRO 100 [IU]/ML
1-6 INJECTION, SOLUTION INTRAVENOUS; SUBCUTANEOUS
Status: DISCONTINUED | OUTPATIENT
Start: 2022-10-14 | End: 2022-10-14

## 2022-10-14 RX ORDER — FUROSEMIDE 40 MG/1
40 TABLET ORAL DAILY
Status: DISCONTINUED | OUTPATIENT
Start: 2022-10-14 | End: 2022-10-15 | Stop reason: HOSPADM

## 2022-10-14 RX ORDER — ATENOLOL 50 MG/1
50 TABLET ORAL DAILY
Status: DISCONTINUED | OUTPATIENT
Start: 2022-10-15 | End: 2022-10-15 | Stop reason: HOSPADM

## 2022-10-14 RX ORDER — FUROSEMIDE 40 MG/1
40 TABLET ORAL DAILY
COMMUNITY

## 2022-10-14 RX ORDER — MAGNESIUM HYDROXIDE 1200 MG/15ML
LIQUID ORAL AS NEEDED
Status: DISCONTINUED | OUTPATIENT
Start: 2022-10-14 | End: 2022-10-14 | Stop reason: HOSPADM

## 2022-10-14 RX ORDER — OXYCODONE HYDROCHLORIDE AND ACETAMINOPHEN 5; 325 MG/1; MG/1
1 TABLET ORAL EVERY 6 HOURS PRN
Status: DISCONTINUED | OUTPATIENT
Start: 2022-10-14 | End: 2022-10-14 | Stop reason: SDUPTHER

## 2022-10-14 RX ORDER — INSULIN LISPRO 100 [IU]/ML
1-6 INJECTION, SOLUTION INTRAVENOUS; SUBCUTANEOUS
Status: DISCONTINUED | OUTPATIENT
Start: 2022-10-15 | End: 2022-10-15 | Stop reason: HOSPADM

## 2022-10-14 RX ORDER — AMLODIPINE BESYLATE 10 MG/1
10 TABLET ORAL DAILY
Status: DISCONTINUED | OUTPATIENT
Start: 2022-10-15 | End: 2022-10-15 | Stop reason: HOSPADM

## 2022-10-14 RX ORDER — INSULIN LISPRO 100 [IU]/ML
1-6 INJECTION, SOLUTION INTRAVENOUS; SUBCUTANEOUS
Status: DISCONTINUED | OUTPATIENT
Start: 2022-10-14 | End: 2022-10-15 | Stop reason: HOSPADM

## 2022-10-14 RX ORDER — BUPIVACAINE HYDROCHLORIDE 2.5 MG/ML
INJECTION, SOLUTION EPIDURAL; INFILTRATION; INTRACAUDAL AS NEEDED
Status: DISCONTINUED | OUTPATIENT
Start: 2022-10-14 | End: 2022-10-14 | Stop reason: HOSPADM

## 2022-10-14 RX ORDER — OXYCODONE HYDROCHLORIDE 5 MG/1
2.5 TABLET ORAL EVERY 4 HOURS PRN
Status: DISCONTINUED | OUTPATIENT
Start: 2022-10-14 | End: 2022-10-15

## 2022-10-14 RX ORDER — ACETAMINOPHEN 325 MG/1
650 TABLET ORAL EVERY 4 HOURS PRN
Status: DISCONTINUED | OUTPATIENT
Start: 2022-10-14 | End: 2022-10-15 | Stop reason: HOSPADM

## 2022-10-14 RX ORDER — FENTANYL CITRATE 50 UG/ML
INJECTION, SOLUTION INTRAMUSCULAR; INTRAVENOUS AS NEEDED
Status: DISCONTINUED | OUTPATIENT
Start: 2022-10-14 | End: 2022-10-14

## 2022-10-14 RX ORDER — KETAMINE HYDROCHLORIDE 50 MG/ML
INJECTION, SOLUTION, CONCENTRATE INTRAMUSCULAR; INTRAVENOUS AS NEEDED
Status: DISCONTINUED | OUTPATIENT
Start: 2022-10-14 | End: 2022-10-14

## 2022-10-14 RX ORDER — OXYCODONE HYDROCHLORIDE AND ACETAMINOPHEN 5; 325 MG/1; MG/1
1 TABLET ORAL EVERY 4 HOURS PRN
Status: DISCONTINUED | OUTPATIENT
Start: 2022-10-14 | End: 2022-10-14

## 2022-10-14 RX ORDER — HYDROMORPHONE HCL IN WATER/PF 6 MG/30 ML
0.2 PATIENT CONTROLLED ANALGESIA SYRINGE INTRAVENOUS
Status: DISCONTINUED | OUTPATIENT
Start: 2022-10-14 | End: 2022-10-15

## 2022-10-14 RX ORDER — SUCCINYLCHOLINE/SOD CL,ISO/PF 100 MG/5ML
SYRINGE (ML) INTRAVENOUS AS NEEDED
Status: DISCONTINUED | OUTPATIENT
Start: 2022-10-14 | End: 2022-10-14

## 2022-10-14 RX ORDER — LISINOPRIL 20 MG/1
20 TABLET ORAL DAILY
Status: DISCONTINUED | OUTPATIENT
Start: 2022-10-15 | End: 2022-10-15 | Stop reason: HOSPADM

## 2022-10-14 RX ORDER — DEXAMETHASONE SODIUM PHOSPHATE 10 MG/ML
INJECTION, SOLUTION INTRAMUSCULAR; INTRAVENOUS AS NEEDED
Status: DISCONTINUED | OUTPATIENT
Start: 2022-10-14 | End: 2022-10-14

## 2022-10-14 RX ORDER — SODIUM CHLORIDE, SODIUM LACTATE, POTASSIUM CHLORIDE, CALCIUM CHLORIDE 600; 310; 30; 20 MG/100ML; MG/100ML; MG/100ML; MG/100ML
INJECTION, SOLUTION INTRAVENOUS CONTINUOUS PRN
Status: DISCONTINUED | OUTPATIENT
Start: 2022-10-14 | End: 2022-10-14

## 2022-10-14 RX ORDER — ONDANSETRON 2 MG/ML
INJECTION INTRAMUSCULAR; INTRAVENOUS AS NEEDED
Status: DISCONTINUED | OUTPATIENT
Start: 2022-10-14 | End: 2022-10-14

## 2022-10-14 RX ORDER — CEFAZOLIN SODIUM 1 G/50ML
1000 SOLUTION INTRAVENOUS ONCE
Status: COMPLETED | OUTPATIENT
Start: 2022-10-14 | End: 2022-10-14

## 2022-10-14 RX ORDER — ONDANSETRON 2 MG/ML
4 INJECTION INTRAMUSCULAR; INTRAVENOUS EVERY 4 HOURS PRN
Status: DISCONTINUED | OUTPATIENT
Start: 2022-10-14 | End: 2022-10-15

## 2022-10-14 RX ADMIN — HYDROMORPHONE HYDROCHLORIDE 0.25 MG: 1 INJECTION, SOLUTION INTRAMUSCULAR; INTRAVENOUS; SUBCUTANEOUS at 09:02

## 2022-10-14 RX ADMIN — FENTANYL CITRATE 50 MCG: 50 INJECTION, SOLUTION INTRAMUSCULAR; INTRAVENOUS at 08:02

## 2022-10-14 RX ADMIN — KETAMINE HYDROCHLORIDE 10 MG: 50 INJECTION INTRAMUSCULAR; INTRAVENOUS at 08:59

## 2022-10-14 RX ADMIN — KETAMINE HYDROCHLORIDE 10 MG: 50 INJECTION INTRAMUSCULAR; INTRAVENOUS at 09:52

## 2022-10-14 RX ADMIN — INSULIN LISPRO 2 UNITS: 100 INJECTION, SOLUTION INTRAVENOUS; SUBCUTANEOUS at 17:21

## 2022-10-14 RX ADMIN — CEFAZOLIN SODIUM 1000 MG: 1 SOLUTION INTRAVENOUS at 07:38

## 2022-10-14 RX ADMIN — DEXAMETHASONE SODIUM PHOSPHATE 10 MG: 10 INJECTION, SOLUTION INTRAMUSCULAR; INTRAVENOUS at 07:50

## 2022-10-14 RX ADMIN — ACETAMINOPHEN 650 MG: 325 TABLET ORAL at 22:19

## 2022-10-14 RX ADMIN — KETAMINE HYDROCHLORIDE 10 MG: 50 INJECTION INTRAMUSCULAR; INTRAVENOUS at 07:50

## 2022-10-14 RX ADMIN — LIDOCAINE HYDROCHLORIDE 40 MG: 10 INJECTION, SOLUTION EPIDURAL; INFILTRATION; INTRACAUDAL; PERINEURAL at 07:34

## 2022-10-14 RX ADMIN — PROPOFOL 50 MG: 10 INJECTION, EMULSION INTRAVENOUS at 07:44

## 2022-10-14 RX ADMIN — PROPOFOL 100 MG: 10 INJECTION, EMULSION INTRAVENOUS at 07:34

## 2022-10-14 RX ADMIN — SODIUM CHLORIDE, SODIUM LACTATE, POTASSIUM CHLORIDE, AND CALCIUM CHLORIDE: .6; .31; .03; .02 INJECTION, SOLUTION INTRAVENOUS at 09:03

## 2022-10-14 RX ADMIN — PHENYLEPHRINE HYDROCHLORIDE 30 MCG/MIN: 10 INJECTION INTRAVENOUS at 07:35

## 2022-10-14 RX ADMIN — FENTANYL CITRATE 25 MCG: 50 INJECTION, SOLUTION INTRAMUSCULAR; INTRAVENOUS at 07:51

## 2022-10-14 RX ADMIN — FENTANYL CITRATE 25 MCG: 50 INJECTION, SOLUTION INTRAMUSCULAR; INTRAVENOUS at 07:34

## 2022-10-14 RX ADMIN — PROPOFOL 50 MCG/KG/MIN: 10 INJECTION, EMULSION INTRAVENOUS at 07:35

## 2022-10-14 RX ADMIN — INSULIN LISPRO 6 UNITS: 100 INJECTION, SOLUTION INTRAVENOUS; SUBCUTANEOUS at 22:19

## 2022-10-14 RX ADMIN — HYDROMORPHONE HYDROCHLORIDE 0.25 MG: 1 INJECTION, SOLUTION INTRAMUSCULAR; INTRAVENOUS; SUBCUTANEOUS at 08:09

## 2022-10-14 RX ADMIN — KETAMINE HYDROCHLORIDE 10 MG: 50 INJECTION INTRAMUSCULAR; INTRAVENOUS at 07:34

## 2022-10-14 RX ADMIN — HYDROMORPHONE HYDROCHLORIDE 0.25 MG: 1 INJECTION, SOLUTION INTRAMUSCULAR; INTRAVENOUS; SUBCUTANEOUS at 09:22

## 2022-10-14 RX ADMIN — Medication 80 MG: at 07:36

## 2022-10-14 RX ADMIN — HYDROMORPHONE HYDROCHLORIDE 0.25 MG: 1 INJECTION, SOLUTION INTRAMUSCULAR; INTRAVENOUS; SUBCUTANEOUS at 10:46

## 2022-10-14 RX ADMIN — KETAMINE HYDROCHLORIDE 10 MG: 50 INJECTION INTRAMUSCULAR; INTRAVENOUS at 08:02

## 2022-10-14 RX ADMIN — SODIUM CHLORIDE, SODIUM LACTATE, POTASSIUM CHLORIDE, AND CALCIUM CHLORIDE: .6; .31; .03; .02 INJECTION, SOLUTION INTRAVENOUS at 07:21

## 2022-10-14 RX ADMIN — ONDANSETRON 4 MG: 2 INJECTION INTRAMUSCULAR; INTRAVENOUS at 07:50

## 2022-10-14 NOTE — DISCHARGE INSTRUCTIONS
POST-OPERATIVE BREAST CARE INSTRUCTIONS    Wound Closure/Dressing: Your wound is closed with:  Surgical glue          Dissolvable sutures-underneath the skin     Wound care: If you have gauze over your wound you may remove it the day after surgery  You may shower using soap and water to clean your wound  Gently pat dry  Drain Care: If you do not have a drain, disregard this section  Visiting Nursing should have been arranged upon discharge from hospital   A nurse will call your home to schedule an initial visit  Please call the number below if you have not received a call within 48 hours of hospital discharge  You may shower with the BUBBA drains  Wash area around the drains with soap and water and pat dry  Record drain outputs on chart given to you at pre op visit and bring that chart to office at post op appt  BUBBA drains can be removed in the office by a nurse either at post op visit OR when drain output is 30 cc’s or less in a 24 hour period for three days in a row  If you had plastic surgery or reconstructive surgery, the plastic surgeon will manage the drains  Other: You may resume using deodorant the day after surgery                 Post-op visit:  your post-op visit is scheduled for:       Call our office at [phone] if you have any  of the following:     Redness, swelling, heat, unusual drainage or heavy bleeding from wound  Fever greater than 101 °  Pain not relieved by medication

## 2022-10-14 NOTE — INTERVAL H&P NOTE
H&P reviewed  After examining the patient I find no changes in the patients condition since the H&P had been written   ROBBIE clips both functional     Vitals:    10/14/22 0648   BP: 160/72   Pulse: 58   Resp: 16   Temp: 97 5 °F (36 4 °C)   SpO2: 99%

## 2022-10-14 NOTE — ANESTHESIA POSTPROCEDURE EVALUATION
Post-Op Assessment Note    CV Status:  Stable    Pain management: adequate     Mental Status:  Lethargic and sleepy   Hydration Status:  Stable   PONV Controlled:  None   Airway Patency:  Patent      Post Op Vitals Reviewed: Yes      Staff: CRNA         No complications documented      BP   148/67   Temp   97 5   Pulse  68   Resp   12   SpO2   100

## 2022-10-14 NOTE — PLAN OF CARE
Problem: MOBILITY - ADULT  Goal: Maintain or return to baseline ADL function  Description: INTERVENTIONS:  -  Assess patient's ability to carry out ADLs; assess patient's baseline for ADL function and identify physical deficits which impact ability to perform ADLs (bathing, care of mouth/teeth, toileting, grooming, dressing, etc )  - Assess/evaluate cause of self-care deficits   - Assess range of motion  - Assess patient's mobility; develop plan if impaired  - Assess patient's need for assistive devices and provide as appropriate  - Encourage maximum independence but intervene and supervise when necessary  - Involve family in performance of ADLs  - Assess for home care needs following discharge   - Consider OT consult to assist with ADL evaluation and planning for discharge  - Provide patient education as appropriate  Outcome: Progressing     Problem: PAIN - ADULT  Goal: Verbalizes/displays adequate comfort level or baseline comfort level  Description: Interventions:  - Encourage patient to monitor pain and request assistance  - Assess pain using appropriate pain scale  - Administer analgesics based on type and severity of pain and evaluate response  - Implement non-pharmacological measures as appropriate and evaluate response  - Consider cultural and social influences on pain and pain management  - Notify physician/advanced practitioner if interventions unsuccessful or patient reports new pain  Outcome: Progressing     Problem: INFECTION - ADULT  Goal: Absence or prevention of progression during hospitalization  Description: INTERVENTIONS:  - Assess and monitor for signs and symptoms of infection  - Monitor lab/diagnostic results  - Monitor all insertion sites, i e  indwelling lines, tubes, and drains  - Monitor endotracheal if appropriate and nasal secretions for changes in amount and color  - [place] appropriate cooling/warming therapies per order  - Administer medications as ordered  - Instruct and encourage patient and family to use good hand hygiene technique  - Identify and instruct in appropriate isolation precautions for identified infection/condition  Outcome: Progressing     Problem: SAFETY ADULT  Goal: Maintain or return to baseline ADL function  Description: INTERVENTIONS:  -  Assess patient's ability to carry out ADLs; assess patient's baseline for ADL function and identify physical deficits which impact ability to perform ADLs (bathing, care of mouth/teeth, toileting, grooming, dressing, etc )  - Assess/evaluate cause of self-care deficits   - Assess range of motion  - Assess patient's mobility; develop plan if impaired  - Assess patient's need for assistive devices and provide as appropriate  - Encourage maximum independence but intervene and supervise when necessary  - Involve family in performance of ADLs  - Assess for home care needs following discharge   - Consider OT consult to assist with ADL evaluation and planning for discharge  - Provide patient education as appropriate  Outcome: Progressing     Problem: SAFETY ADULT  Goal: Patient will remain free of falls  Description: INTERVENTIONS:  - Educate patient/family on patient safety including physical limitations  - Instruct patient to call for assistance with activity   - Consult OT/PT to assist with strengthening/mobility   - Keep Call bell within reach  - Keep bed low and locked with side rails adjusted as appropriate  - Keep care items and personal belongings within reach  - Initiate and maintain comfort rounds  - Make Fall Risk Sign visible to staff  - Offer Toileting every 2 Hours, in advance of need  - Initiate/Maintain bed alarm  - Obtain necessary fall risk management equipment: fall cushion     Outcome: Progressing    Problem: SKIN/TISSUE INTEGRITY - ADULT  Goal: Incision(s), wounds(s) or drain site(s) healing without S/S of infection  Description: INTERVENTIONS  - Assess and document dressing, incision, wound bed, drain sites and surrounding tissue  - Provide patient and family education  Outcome: Progressing

## 2022-10-14 NOTE — ANESTHESIA PREPROCEDURE EVALUATION
Procedure:  BREAST MODIFIED RADICAL MASTECTOMY; ROBBIE  DIRECTED (Left Breast)  BREAST SIMPLE MASTECTOMY (Right Breast)  LYMPHATIC MAPPING WITH BLUE AND RADIOACTIVE DYES (INJECT AT 0730 BY DR DOAN IN THE OR) (Left Axilla)    Relevant Problems   CARDIO   (+) Benign essential hypertension   (+) Hyperlipidemia      ENDO   (+) Type 2 diabetes mellitus ([address] )      GYN   (+) Malignant neoplasm of central portion of left breast in female, estrogen receptor positive (HCC)      NEURO/PSYCH   (+) Depression with anxiety     H/o TIA    TTE Oct 2022:  •  Left Ventricle: Left ventricular cavity size is normal  Wall thickness is mildly increased  There is mild concentric hypertrophy  The left ventricular ejection fraction is 60%  Systolic function is normal  Wall motion is normal  Diastolic function is abnormal   •  Aortic Valve: The aortic valve is trileaflet  The leaflets are mildly thickened  The leaflets are moderately calcified  The leaflets exhibit normal mobility  There is aortic sclerosis  Physical Exam    Airway    Mallampati score: II  TM Distance: >3 FB  Neck ROM: full     Dental       Cardiovascular  Rate: normal,     Pulmonary  Pulmonary exam normal     Other Findings        Anesthesia Plan  ASA Score- 2     Anesthesia Type- general with ASA Monitors  Additional Monitors:   Airway Plan:           Plan Factors-    Chart reviewed  EKG reviewed  Existing labs reviewed  Patient summary reviewed  Induction- intravenous  Postoperative Plan- Plan for postoperative opioid use  Planned trial extubation    Informed Consent- Anesthetic plan and risks discussed with patient  I personally reviewed this patient with the CRNA  Discussed and agreed on the Anesthesia Plan with the CRNA  Dara Soulier

## 2022-10-14 NOTE — QUICK NOTE
Acute Care Surgery   Post-Op Check Progress Note   Shahbaz Spencer [de-identified] y o  female MRN: [id number]  Unit/Bed#: S -01 Encounter: [id number]    Assessment and Plan:    [de-identified] y o  female s/p right simple mastectomy and left modified radical mastectomy with pectoral nerve block   - Diet as tolerated   - P R N  Analgesia   - Ice packs for comfort   - Encouraged incentive spirometry use    Subjective/Objective     Subjective: Patient was seen and examined once they arrived to the floor  Son at bedside, no reports of pain  Objective:     Blood pressure 144/64, pulse 72, temperature 97 8 °F (36 6 °C), resp  rate 13, SpO2 94 %  ,There is no height or weight on file to calculate BMI  Intake/Output Summary (Last 24 hours) at 10/14/2022 1530  Last data filed at 10/14/2022 1047  Gross per 24 hour   Intake 1500 ml   Output 50 ml   Net 1450 ml       Invasive Devices  Report    Peripheral Intravenous Line  Duration           Peripheral IV 10/14/22 Dorsal (posterior); Right Forearm <1 day          Drain  Duration           Closed/Suction Drain Left;Lateral Chest Bulb 19 Fr  <1 day    Closed/Suction Drain Left;Medial Chest Bulb 19 Fr  <1 day    Closed/Suction Drain Right;Lateral Chest Bulb 19 Fr  <1 day    Closed/Suction Drain Right;Medial Chest Bulb 19 Fr  <1 day                Physical Exam:    GEN: Patient in no acute distress, resting comfortably in bed  CV: Regular rate   LUNGS: Breathing comfortably on RA  CHEST: b/l mastectomy incision c/d/i  No erythema or edema  B/l BUBBA x2 with minimal sersang output  NEURO: Alert and oriented  no focal neurologic deficits  SKIN: Warm, dry and well perfused; no rash; no jaundice        Tila Goetz  10/14/2022

## 2022-10-14 NOTE — OP NOTE
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 ([address] ) [C50 912, C77 3]    Post-Op Diagnosis Codes:     * Malignant neoplasm of central portion of left breast in female, estrogen receptor positive ([address] ) [C50 112, Z17 0]     * 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 (HCC) [C50 912, C77 3]    Procedure(s) (LRB):  BREAST MODIFIED RADICAL MASTECTOMY; ROBBIE  DIRECTED (Left)  BREAST SIMPLE MASTECTOMY (Right)  LYMPHATIC MAPPING WITH BLUE AND RADIOACTIVE DYES (INJECT AT 0730 BY DR DOAN IN THE OR) (Left)      Bilateral pectoral nerve block - margaret    Specimen(s):  ID Type Source Tests Collected by Time Destination   1 : SHORT STITCH MARKS SUPERIOR, LONG STITCH MARKS LATERAL, MEDIUM STITCH MARKS MEDIAL Tissue Breast, Right TISSUE EXAM Keira Mccracken MD 10/14/2022 5389    2 : SHORT STITCH MARKS SUPERIOR, MEDIUM STITCH MARKS MEDIAL, LONG STITCH MARKS LATERAL Tissue Breast, Left TISSUE Delon Alexander MD 10/14/2022 5196    3 : Left axillary contents Tissue Axillary TISSUE EXAM Keira Mccracken MD 10/14/2022 0956        Estimated Blood Loss:   50 mL    Drains:  Closed/Suction Drain Right;Lateral Chest Bulb 19 Fr  (Active)   Number of days: 0       Closed/Suction Drain Right;Medial Chest Bulb 19 Fr  (Active)   Number of days: 0       Closed/Suction Drain Left;Medial Chest Bulb 19 Fr  (Active)   Number of days: 0       Closed/Suction Drain Left;Lateral Chest Bulb 19 Fr   (Active)   Number of days: 0       Anesthesia Type:   General    Operative Indications:  Malignant neoplasm of central portion of left breast in female, estrogen receptor positive ([facility] [address] ) [C50 112, Z17 0]  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 (HCC) [C50 912, C77 3]      Operative Findings:  Right breast:  NL anatomy, no adenopathy  Left breast:  No blue or radioactive dye migrated to axilla  Axillary bx node with ROBBIE in Axillary dissection specimen, matted nodes, no residual disease palpable follow axillary dissection  Breast ROBBIE node out, margin ca 14 mm by ROBBIE  Complications:   None    Procedure and Technique:  The patient was brought to the operation room and placed under general anesthesia  Attention was paid to ensure appropriate padding and correct positioning    Following induction, I injected the left breast with 0 3 cc methylene blue and technetium    The bilateral breast were prepped and draped in a sterile fashion  I initiated a time-out, identifying the patient, the correct side and the above procedure  All parties agreed with the time out  Right Mastectomy:  The planned incision was sharply incised  Thin flaps were then elevated using electrocautery starting superiorly and then continuing medially, inferiorly and finally laterally  The tail of Violetta Roche was then dissected away from the axilla proper leaving the breast tethered to the underlying musculature  The breast was then removed from the muscles in a sub-facial plane  The breast was oriented with sutures (short=superior; medium=medial; long=lateral)  The breast was sent to pathology in formalin for permanent pathologic evaluation  Right Nerve block:  Twenty ml of a 1:1 mixture of 0 25% marcain and Exparel were injected into the subfascial plane between the pectoral muscles, underneath the serratus anterior fascial plane and also along the border of the inferior flap  Two nineteen [place] slotted BUBBA catheters were then brought in through 2 separate lateral stab incisions and secured with nylon sutures  The wound was extensively irrigated    Superficial bleeding was controlled with electrocautery  The wound was then closed with interrupted 2 0 Vicryl sutures followed by for Monocryl  Left Mastectomy:  The planned incision was sharply incised  Thin flaps were then elevated using electrocautery starting superiorly and then continuing medially, inferiorly and finally laterally  The ROBBIE detector was used to be widely around the known 3:00 tumor  The tail Mukesh Chavez was then dissected away from the axilla and divided at the distal edge of the Tiffany leaving the breast tethered to the underlying musculature  The breast was then removed from the muscles in a sub-facial plane  The breast was oriented with sutures (short=superior; medium=medial; long=lateral)  The breast was sent to pathology in formalin for permanent pathologic evaluation  The ROBBIE clip was well within the breast with grossly wide margins  Axillary Dissection: The medial and lateral flaps were then extended into the upper axilla  The pectoralis minor was then retracted medially and level 2 nodes were brought into field  Just underneath they estimated position of the subclavian vein the deltopectoral fascia was released with electrocautery  At this level the ROBBIE clip was detected, the was a firm cluster of matted nodes  Blunt dissection was then taken down to identify intercostal nerve which was divided  Along the medial wall the long thoracic nerve was identified and preserved  The cluster of matted nodes were carefully dissected from the surrounding structures, this was just inferior to the subclavian vein and extending towards level III, but not in it  Once the tejinder group was released from fibrous attachments, palpation in level III was normal (I e  there were no palpable lymph nodes at this level   Additonally after releasing the firbrous attachments of the tejinder cluster and with meticulous dissection the thoracodorsal neurovascular bundle was identified in the nerve carefully preserved along its course  During this dissection small veins and arteries were either ligated cauterized or clipped to maintain meticulous hemostasis  The axillary contents (levels 1 and 2, containing the matted tejinder cluster) were within withdrawn and  from the remaining fascial attachments with electrocautery  There then handed off the field for permanent pathologic analysis  Left Nerve block:  Twenty ml of a 1:1 mixture of 0 25% marcain and Exparel were injected into the subfascial plane between the pectoral muscles, underneath the serratus anterior fascial plane and also along the border of the inferior flap  Two hundred nineteen [place] slotted BUBBA catheter was then brought in through 2 separate lateral stab incisions and secured with nylon sutures  The wound was extensively irrigated  Superficial bleeding was controlled with electrocautery  The wound was then closed with interrupted 2 0 Vicryl sutures followed by for Monocryl  Steri-Strips were applied  The needle count lap counts sponge counts were correct x2  I was present for the entire procedure, A qualified resident physician was not available and A physician assistant was required during the procedure for retraction tissue handling,dissection and suturing    Patient Disposition:  PACU       Left Breast Axillary Dissection:  Operation performed with curative intent  Yes    Resection was performed within the boundaries of the axillary vein, chest wall (serratus anterior), and latissimus dorsi  Yes    Nerves Identified and preserved during dissection (select all that apply) Long Thoracic nerve and Thoracodorsal nerve   Level III nodes were removed  No        Right breast:  The operation was not used to treat a known cancer            SIGNATURE: Kaela Patiño MD  DATE: October 14, 2022  TIME: 10:27 AM

## 2022-10-15 VITALS
SYSTOLIC BLOOD PRESSURE: 105 MMHG | HEART RATE: 59 BPM | DIASTOLIC BLOOD PRESSURE: 52 MMHG | TEMPERATURE: 99.1 F | OXYGEN SATURATION: 95 % | RESPIRATION RATE: 18 BRPM

## 2022-10-15 DIAGNOSIS — C50.912 CARCINOMA OF LEFT BREAST METASTATIC TO AXILLARY LYMPH NODE (HCC): Primary | ICD-10-CM

## 2022-10-15 DIAGNOSIS — C77.3 CARCINOMA OF LEFT BREAST METASTATIC TO AXILLARY LYMPH NODE (HCC): Primary | ICD-10-CM

## 2022-10-15 DIAGNOSIS — Z17.0 MALIGNANT NEOPLASM OF CENTRAL PORTION OF LEFT BREAST IN FEMALE, ESTROGEN RECEPTOR POSITIVE (HCC): Primary | ICD-10-CM

## 2022-10-15 DIAGNOSIS — C50.112 MALIGNANT NEOPLASM OF CENTRAL PORTION OF LEFT BREAST IN FEMALE, ESTROGEN RECEPTOR POSITIVE (HCC): Primary | ICD-10-CM

## 2022-10-15 LAB
GLUCOSE SERPL-MCNC: 153 MG/DL (ref 65–140)
GLUCOSE SERPL-MCNC: 251 MG/DL (ref 65–140)

## 2022-10-15 PROCEDURE — NC001 PR NO CHARGE: Performed by: SURGERY

## 2022-10-15 PROCEDURE — 99024 POSTOP FOLLOW-UP VISIT: CPT | Performed by: SURGERY

## 2022-10-15 PROCEDURE — 82948 REAGENT STRIP/BLOOD GLUCOSE: CPT

## 2022-10-15 RX ORDER — OXYCODONE HYDROCHLORIDE 5 MG/1
5 TABLET ORAL EVERY 4 HOURS PRN
Qty: 12 TABLET | Refills: 0 | Status: SHIPPED | OUTPATIENT
Start: 2022-10-15 | End: 2022-10-15

## 2022-10-15 RX ORDER — OXYCODONE HYDROCHLORIDE AND ACETAMINOPHEN 5; 325 MG/1; MG/1
1 TABLET ORAL EVERY 4 HOURS PRN
Qty: 10 TABLET | Refills: 0 | Status: SHIPPED | OUTPATIENT
Start: 2022-10-15 | End: 2022-10-15

## 2022-10-15 RX ADMIN — ATENOLOL 50 MG: 50 TABLET ORAL at 08:00

## 2022-10-15 RX ADMIN — LISINOPRIL 20 MG: 20 TABLET ORAL at 08:00

## 2022-10-15 RX ADMIN — ACETAMINOPHEN 650 MG: 325 TABLET ORAL at 08:36

## 2022-10-15 RX ADMIN — AMLODIPINE BESYLATE 10 MG: 10 TABLET ORAL at 08:00

## 2022-10-15 RX ADMIN — ATORVASTATIN CALCIUM 20 MG: 20 TABLET, FILM COATED ORAL at 08:00

## 2022-10-15 RX ADMIN — INSULIN LISPRO 3 UNITS: 100 INJECTION, SOLUTION INTRAVENOUS; SUBCUTANEOUS at 11:26

## 2022-10-15 RX ADMIN — FUROSEMIDE 40 MG: 40 TABLET ORAL at 08:00

## 2022-10-15 RX ADMIN — LETROZOLE 2.5 MG: 2.5 TABLET, FILM COATED ORAL at 08:00

## 2022-10-15 RX ADMIN — INSULIN LISPRO 1 UNITS: 100 INJECTION, SOLUTION INTRAVENOUS; SUBCUTANEOUS at 07:58

## 2022-10-15 NOTE — PROGRESS NOTES
Progress Note - Shahbaz Spencer [de-identified] y o  female MRN: [id number]    Unit/Bed#: S -01 Encounter: [id number]      Assessment:  Carmen Godinez is a [de-identified] y o  female POD1 b/l mastectomy    VSS  BUBBA drain x 4: R 10cc serosang R 10cc serosang L 20cc light sang L 30cc light sang/serosang  FSBG elevated in setting of A1c >8; SSI in place for coverage    Plan:  Diet as tolerated  D/c home with VNA for drain care  Outpatient follow up surg onc  Outpatient follow up PCP re:DM  Pain and nausea control PRN    Subjective:   No overnight events  Tolerating diet  Pain well controlled  Minimal drain output  Voiding spontaneously  +BMs  Objective:     Vitals: Blood pressure 149/60, pulse 70, temperature 98 7 °F (37 1 °C), temperature source Oral, resp  rate 18, SpO2 92 %  ,There is no height or weight on file to calculate BMI  Intake/Output Summary (Last 24 hours) at 10/15/2022 0751  Last data filed at 10/15/2022 0617  Gross per 24 hour   Intake 1500 ml   Output 1820 ml   Net -320 ml       Physical Exam:   General - no acute distress, responsive and cooperative  CV - warm, regular rate  Chest - incisions c/d/i without hematoma or ecchymosis, BUBBA drian x 4 with serosang/light sang output  Pulm - normal work of breathing, no respiratory distress  Abd - soft, nondistended  Neuro - m/s grossly intact, cn grossly intact  Ext - moving all extremities       Invasive Devices  Report    Peripheral Intravenous Line  Duration           Peripheral IV 10/14/22 Dorsal (posterior); Right Forearm 1 day          Drain  Duration           Closed/Suction Drain Left;Lateral Chest Bulb 19 Fr  <1 day    Closed/Suction Drain Left;Medial Chest Bulb 19 Fr  <1 day    Closed/Suction Drain Right;Lateral Chest Bulb 19 Fr  <1 day    Closed/Suction Drain Right;Medial Chest Bulb 19 Fr  <1 day                Lab, Imaging and other studies: I have personally reviewed pertinent reports      VTE Mechanical Prophylaxis: sequential compression device

## 2022-10-15 NOTE — DISCHARGE SUMMARY
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 ([facility] [address] ) [C50 912, C77 3]    HPI: as per Sen Kim MD on 10/14/22, "This is a 29-year-old Croatian speaking woman who appreciated a mass in her left breast   Her son who is a physician also appreciated adenopathy  She had a bilateral diagnostic mammogram and ultrasound  On the right side there were 2 indeterminate areas for which a contrast enhanced mammogram and possible biopsies were recommended  On the left side there was a 3 cm mass at the 3 o'clock position 10 cm from the nipple as well as multiple calcifications with suspicious findings extending towards the nipple spanned an area of approximately 7 5 cm  She also had suspicious adenopathy with at least 3 ultrasound suspicious lymph nodes  The lymph node and the primary mass were biopsied and ROBBIE  clips were placed at each site  The tumor was grade 2 ER 90% DC 55% HER2 negative  Her Ki 67 was 10-20  She had no lymphovascular invasion  Her lymph node was positive  She has had a CT scan which demonstrated no Mets but she did have multiple 1-2 mm nodules for which follow-up CT was recommended  Patient presents now with her son for an opinion regarding further management  They have previously discussed management of the right breast and prefer removing the breast as opposed to undergoing additional imaging and biopsies  The patient her family are interested in genetic testing   "    Procedures Performed: No orders of the defined types were placed in this encounter  Hospital Course:   The patient was admitted to the hospital following a successful left modified radical mastectomy with concurrent right simple mastectomy  Left-sided lymphatic mapping was also performed intraoperatively  Postoperatively, her pain was well controlled, her surgical sites were clean and dry, and she was able to  tolerate a diet  She is a baseline diabetic with a hemoglobin A1c greater than 8, and her blood sugars were elevated over the course of her hospitalization  On the morning of postoperative day 1, she was seen by the surgical team, noted to be in no distress, appeared comfortable with a reassuring appearance of her surgical sites, and expected serosanguineous drain output within reasonable quantitative limits  Following evaluation by the surgical oncologist on call, she was deemed suitable for discharge to home in good condition  Prior to dischargefrom the hospital, the patient had provisions for surgical follow-up, primary care follow-up with attention to her blood glucose, medication reconciliation, arrangement of visiting nursing services, wound care, and medication reconciliation made  Significant Findings, Care, Treatment and Services Provided:     10/14/2022:  Left modified radical mastectomy with intraoperative lymphatic mapping, right simple mastectomy    Lab Results: I have personally reviewed pertinent lab results  , CBC: No results found for: WBC, HGB, HCT, MCV, PLT, ADJUSTEDWBC, MCH, MCHC, RDW, MPV, NRBC, CMP: No results found for: SODIUM, K, CL, CO2, ANIONGAP, BUN, CREATININE, GLUCOSE, CALCIUM, AST, ALT, ALKPHOS, PROT, BILITOT, EGFR    Complications:  None    Discharge Diagnosis:  Malignant neoplasm of the left breast with axillary lymph node metastases    Medical Problems             Resolved Problems  Date Reviewed: 9/30/2022   None                 Condition at Discharge: good         Discharge instructions/Information to patient and family:   See after visit summary for information provided to patient and family        Provisions for Follow-Up Care:  See after visit summary for information related to follow-up care and any pertinent home health orders  Disposition: Home      Planned Readmission: No    Discharge Statement   I spent 28 minutes discharging the patient  This time was spent on the day of discharge  I had direct contact with the patient on the day of discharge  Additional documentation is required if more than 30 minutes were spent on discharge  Discharge Medications:  See after visit summary for reconciled discharge medications provided to patient and family

## 2022-10-15 NOTE — PLAN OF CARE
Problem: MOBILITY - ADULT  Goal: Maintain or return to baseline ADL function  Description: INTERVENTIONS:  -  Assess patient's ability to carry out ADLs; assess patient's baseline for ADL function and identify physical deficits which impact ability to perform ADLs (bathing, care of mouth/teeth, toileting, grooming, dressing, etc )  - Assess/evaluate cause of self-care deficits   - Assess range of motion  - Assess patient's mobility; develop plan if impaired  - Assess patient's need for assistive devices and provide as appropriate  - Encourage maximum independence but intervene and supervise when necessary  - Involve family in performance of ADLs  - Assess for home care needs following discharge   - Consider OT consult to assist with ADL evaluation and planning for discharge  - Provide patient education as appropriate  Outcome: Progressing  Goal: Maintains/Returns to pre admission functional level  Description: INTERVENTIONS:  - Perform BMAT or MOVE assessment daily    - Set and communicate daily mobility goal to care team and patient/family/caregiver  - Collaborate with rehabilitation services on mobility goals if consulted  - Perform Range of Motion  times a day  - Reposition patient every 2 hours    - Dangle patient 2 times a day  - Stand patient 2 times a day  - Ambulate patient 2 times a day  - Out of bed to chair 2 times a day   - Out of bed for meals 2 times a day  - Out of bed for toileting  - Record patient progress and toleration of activity level   Outcome: Progressing     Problem: PAIN - ADULT  Goal: Verbalizes/displays adequate comfort level or baseline comfort level  Description: Interventions:  - Encourage patient to monitor pain and request assistance  - Assess pain using appropriate pain scale  - Administer analgesics based on type and severity of pain and evaluate response  - Implement non-pharmacological measures as appropriate and evaluate response  - Consider cultural and social influences on pain and pain management  - Notify physician/advanced practitioner if interventions unsuccessful or patient reports new pain  Outcome: Progressing     Problem: INFECTION - ADULT  Goal: Absence or prevention of progression during hospitalization  Description: INTERVENTIONS:  - Assess and monitor for signs and symptoms of infection  - Monitor lab/diagnostic results  - Monitor all insertion sites, i e  indwelling lines, tubes, and drains  - Monitor endotracheal if appropriate and nasal secretions for changes in amount and color  - [place] appropriate cooling/warming therapies per order  - Administer medications as ordered  - Instruct and encourage patient and family to use good hand hygiene technique  - Identify and instruct in appropriate isolation precautions for identified infection/condition  Outcome: Progressing  Goal: Absence of fever/infection during neutropenic period  Description: INTERVENTIONS:  - Monitor WBC    Outcome: Progressing     Problem: SAFETY ADULT  Goal: Maintain or return to baseline ADL function  Description: INTERVENTIONS:  -  Assess patient's ability to carry out ADLs; assess patient's baseline for ADL function and identify physical deficits which impact ability to perform ADLs (bathing, care of mouth/teeth, toileting, grooming, dressing, etc )  - Assess/evaluate cause of self-care deficits   - Assess range of motion  - Assess patient's mobility; develop plan if impaired  - Assess patient's need for assistive devices and provide as appropriate  - Encourage maximum independence but intervene and supervise when necessary  - Involve family in performance of ADLs  - Assess for home care needs following discharge   - Consider OT consult to assist with ADL evaluation and planning for discharge  - Provide patient education as appropriate  Outcome: Progressing  Goal: Maintains/Returns to pre admission functional level  Description: INTERVENTIONS:  - Perform BMAT or MOVE assessment daily    - Set and communicate daily mobility goal to care team and patient/family/caregiver  - Collaborate with rehabilitation services on mobility goals if consulted  - Perform Range of Motion 2 times a day  - Reposition patient every 2 hours    - Dangle patient 2 times a day  - Stand patient 2 times a day  - Ambulate patient 2 times a day  - Out of bed to chair 2 times a day   - Out of bed for meals 2 times a day  - Out of bed for toileting  - Record patient progress and toleration of activity level   Outcome: Progressing  Goal: Patient will remain free of falls  Description: INTERVENTIONS:  - Educate patient/family on patient safety including physical limitations  - Instruct patient to call for assistance with activity   - Consult OT/PT to assist with strengthening/mobility   - Keep Call bell within reach  - Keep bed low and locked with side rails adjusted as appropriate  - Keep care items and personal belongings within reach  - Initiate and maintain comfort rounds  - Make Fall Risk Sign visible to staff  - Offer Toileting every 2 Hours, in advance of need  - Initiate/Maintain bed alarm  - Obtain necessary fall risk management equipment  - Apply yellow socks and bracelet for high fall risk patients  - Consider moving patient to room near nurses station  Outcome: Progressing     Problem: SKIN/TISSUE INTEGRITY - ADULT  Goal: Skin Integrity remains intact(Skin Breakdown Prevention)  Description: Assess:  -Perform Claudio assessment every   -Clean and moisturize skin every   -Inspect skin when repositioning, toileting, and assisting with ADLS  -Assess under medical devices such as  every   -Assess extremities for adequate circulation and sensation     Bed Management:  -Have minimal linens on bed & keep smooth, unwrinkled  -Change linens as needed when moist or perspiring  -Avoid sitting or lying in one position for more than  hours while in bed  -Keep HOB at degrees     Toileting:  -Offer bedside commode  -Assess for incontinence every   -Use incontinent care products after each incontinent episode such as     Activity:  -Mobilize patient  times a day  -Encourage activity and walks on unit  -Encourage or provide ROM exercises   -Turn and reposition patient every  Hours  -Use appropriate equipment to lift or move patient in bed  -Instruct/ Assist with weight shifting every  when out of bed in chair  -Consider limitation of chair time  hour intervals    Skin Care:  -Avoid use of baby powder, tape, friction and shearing, hot water or constrictive clothing  -Relieve pressure over bony prominences using   -Do not massage red bony areas    Next Steps:  -Teach patient strategies to minimize risks such as    -Consider consults to  interdisciplinary teams such as   Outcome: Progressing  Goal: Incision(s), wounds(s) or drain site(s) healing without S/S of infection  Description: INTERVENTIONS  - Assess and document dressing, incision, wound bed, drain sites and surrounding tissue  - Provide patient and family education  - Perform skin care/dressing changes every  Outcome: Progressing     Problem: Prexisting or High Potential for Compromised Skin Integrity  Goal: Skin integrity is maintained or improved  Description: INTERVENTIONS:  - Identify patients at risk for skin breakdown  - Assess and monitor skin integrity  - Assess and monitor nutrition and hydration status  - Monitor labs   - Assess for incontinence   - Turn and reposition patient  - Assist with mobility/ambulation  - Relieve pressure over bony prominences  - Avoid friction and shearing  - Provide appropriate hygiene as needed including keeping skin clean and dry  - Evaluate need for skin moisturizer/barrier cream  - Collaborate with interdisciplinary team   - Patient/family teaching  - Consider wound care consult   Outcome: Progressing

## 2022-10-17 ENCOUNTER — HOME CARE VISIT (OUTPATIENT)
Dept: HOME HEALTH SERVICES | Facility: HOME HEALTHCARE | Age: 82
End: 2022-10-17
Payer: COMMERCIAL

## 2022-10-17 ENCOUNTER — PATIENT OUTREACH (OUTPATIENT)
Dept: HEMATOLOGY ONCOLOGY | Facility: CLINIC | Age: 82
End: 2022-10-17

## 2022-10-17 VITALS
RESPIRATION RATE: 16 BRPM | SYSTOLIC BLOOD PRESSURE: 128 MMHG | OXYGEN SATURATION: 98 % | HEART RATE: 63 BPM | TEMPERATURE: 96.8 F | DIASTOLIC BLOOD PRESSURE: 80 MMHG

## 2022-10-17 PROCEDURE — 400013 VN SOC

## 2022-10-17 PROCEDURE — G0299 HHS/HOSPICE OF RN EA 15 MIN: HCPCS

## 2022-10-17 NOTE — PROGRESS NOTES
Breast Oncology Nurse Navigator    Saw patient's son Jacquelyn Pittman in the oncology office this morning  States patient is doing well and recovering from surgery  Has not taken more than a few Tylenol for pain relief  Has a follow-up appointment scheduled in two weeks

## 2022-10-18 ENCOUNTER — PATIENT OUTREACH (OUTPATIENT)
Dept: CASE MANAGEMENT | Facility: OTHER | Age: 82
End: 2022-10-18

## 2022-10-18 NOTE — PROGRESS NOTES
OSW emailed pt's son,   Sarthak Priscilla regarding his mother since her surgery last week  He responded she is doing well  She is active with [facility] for home care  He expressed appreciation for outreach  OSW will continue to follow and offer support as needed

## 2022-10-19 ENCOUNTER — HOME CARE VISIT (OUTPATIENT)
Dept: HOME HEALTH SERVICES | Facility: HOME HEALTHCARE | Age: 82
End: 2022-10-19
Payer: COMMERCIAL

## 2022-10-19 VITALS
DIASTOLIC BLOOD PRESSURE: 76 MMHG | RESPIRATION RATE: 16 BRPM | HEART RATE: 58 BPM | SYSTOLIC BLOOD PRESSURE: 118 MMHG | TEMPERATURE: 96.1 F | OXYGEN SATURATION: 98 %

## 2022-10-19 PROCEDURE — G0299 HHS/HOSPICE OF RN EA 15 MIN: HCPCS

## 2022-10-24 ENCOUNTER — HOME CARE VISIT (OUTPATIENT)
Dept: HOME HEALTH SERVICES | Facility: HOME HEALTHCARE | Age: 82
End: 2022-10-24
Payer: COMMERCIAL

## 2022-10-24 VITALS
HEART RATE: 59 BPM | TEMPERATURE: 96 F | SYSTOLIC BLOOD PRESSURE: 128 MMHG | DIASTOLIC BLOOD PRESSURE: 62 MMHG | OXYGEN SATURATION: 99 % | RESPIRATION RATE: 16 BRPM

## 2022-10-24 PROCEDURE — G0299 HHS/HOSPICE OF RN EA 15 MIN: HCPCS

## 2022-10-25 ENCOUNTER — HOME CARE VISIT (OUTPATIENT)
Dept: HOME HEALTH SERVICES | Facility: HOME HEALTHCARE | Age: 82
End: 2022-10-25
Payer: COMMERCIAL

## 2022-10-28 ENCOUNTER — HOME CARE VISIT (OUTPATIENT)
Dept: HOME HEALTH SERVICES | Facility: HOME HEALTHCARE | Age: 82
End: 2022-10-28
Payer: COMMERCIAL

## 2022-10-28 VITALS — TEMPERATURE: 96 F | OXYGEN SATURATION: 98 % | RESPIRATION RATE: 16 BRPM | HEART RATE: 70 BPM

## 2022-10-28 PROCEDURE — G0299 HHS/HOSPICE OF RN EA 15 MIN: HCPCS

## 2022-10-31 ENCOUNTER — TELEPHONE (OUTPATIENT)
Dept: HEMATOLOGY ONCOLOGY | Facility: CLINIC | Age: 82
End: 2022-10-31

## 2022-10-31 ENCOUNTER — PATIENT OUTREACH (OUTPATIENT)
Dept: CASE MANAGEMENT | Facility: OTHER | Age: 82
End: 2022-10-31

## 2022-10-31 ENCOUNTER — OFFICE VISIT (OUTPATIENT)
Dept: SURGICAL ONCOLOGY | Facility: CLINIC | Age: 82
End: 2022-10-31

## 2022-10-31 DIAGNOSIS — C77.3 CARCINOMA OF LEFT BREAST METASTATIC TO AXILLARY LYMPH NODE (HCC): Primary | ICD-10-CM

## 2022-10-31 DIAGNOSIS — C50.912 CARCINOMA OF LEFT BREAST METASTATIC TO AXILLARY LYMPH NODE (HCC): Primary | ICD-10-CM

## 2022-10-31 NOTE — TELEPHONE ENCOUNTER
Tried calling patient to schedule PT and 3 mo f/u with Dr Katherine Fuentes; however, could not leave a message due to no Communication Consent in the chart

## 2022-10-31 NOTE — PROGRESS NOTES
OSW reached out to pt's son, Dr Sandie Santoro to check in and offer support  Also requested distress thermometer completion as able  Dr Sandie Santoro returned call and stated his mother is doing very well physically since her mastectomies, and her drains have been removed  [facility] did complete their visits, and he was very pleased with their care  He feels his mother may be a bit embarrassed about her breasts being removed, however he has not discussed with her just yet, stating it may be too early to bring up  Dr Sandie Santoro stated his mother is in need of dental work  Explained she should be eligible for dental services through [facility] and offered to send him the information via email  He expressed appreciation for same  OSW will continue to follow

## 2022-10-31 NOTE — PROGRESS NOTES
Surgical Oncology Breast Post-Op       [address]  [facility]  [address] [place] [address]    Shahbaz Spencer  1940  [id number]  [address]  [facility]  [address] [address]    Chief Complaint:   Andrew Tobar is seen for a post-operative visit of her recent breast surgery  Oncology History:     Oncology History   Malignant neoplasm of central portion of left breast in female, estrogen receptor positive ([address] )   8/1/2022 -  Hormone Therapy    Femara daily     9/16/2022 Biopsy    Left breast ultrasound-guided biopsy  3 o'clock, 10 cm from nipple  A  Invasive carcinoma of no special type (ductal)  Grade 2  ER 90; OH 55; HER2 2+, FISH negative; Ki-67 >10<20  Lymphovascular invasion not identified    B  Left axillary lymph node biopsy  Adenocarcinoma, morphologically consistent with breast primary    Concordant  Malignancy appears multifocal; spans 7 5 cm on mammo  Largest individual mass measures 3 cm on US  Biopsy-proven axillary level 1 metastatic adenopathy; 3 enlarged axillary LN on US  ROBBIE  reflectors placed at both biopsy sites  Right breast indeterminate findings; additional imaging and possible biopsies recommended based on surgical plan/discussion  9/30/2022 Genetic Testing    Genetic testing ordered  [facility]         Assessment & Plan:   Assessment/Plan    Patient presented for a postoperative visit she has no complaints she presented with her son, her pathology was not available however her drains were putting out less than 20 cc a day  Her drains were removed without difficulty  We will coordinate her follow-up with her son  We will have her see physical therapy  Dr Aram Thakur will be managing her  He is reviewed her situation with Dr Stefani Guillaume but we will present her working group    History of Present Illness:   See Oncology History    Interval History:   See Assessment & Plan    Review of Systems:   Review of Systems   All other systems reviewed and are negative  Past Medical History:     Patient Active Problem List   Diagnosis   • Hyperlipidemia   • Type 2 diabetes mellitus ([facility] )   • Depression with anxiety   • Benign essential hypertension   • Age-related osteoporosis without current pathological fracture   • Malignant neoplasm of central portion of left breast in female, estrogen receptor positive ([facility] )   • 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 [facility])     Past Medical History:   Diagnosis Date   • Diabetes mellitus ([facility] )    • History of transfusion    • Hyperlipidemia    • TIA (transient ischemic attack)      Past Surgical History:   Procedure Laterality Date   • HYSTERECTOMY     • LYMPH NODE BIOPSY Left 10/14/2022    Procedure: LYMPHATIC MAPPING WITH BLUE AND RADIOACTIVE DYES (INJECT AT 0730 BY DR DOAN IN THE OR); Surgeon: Kristopher Rao MD;  [facility];  Service: Surgical Oncology   • MODIFIED RADICAL MASTECTOMY W/ AXILLARY LYMPH NODE DISSECTION Left 10/14/2022    Procedure: BREAST MODIFIED RADICAL MASTECTOMY; ROBBIE  DIRECTED;  Surgeon: Kristopher Rao MD;  [facility];  Service: Surgical Oncology   • SIMPLE MASTECTOMY Right 10/14/2022    Procedure: BREAST SIMPLE MASTECTOMY;  Surgeon: Kristopher Rao MD;  [facility];  Service: Surgical Oncology   • US BREAST NEEDLE LOC LEFT Left 9/16/2022   • US BREAST NEEDLE LOC RIGHT EACH ADDITIONAL Right 9/16/2022   • US GUIDED BREAST BIOPSY LEFT COMPLETE Left 9/16/2022   • US GUIDED BREAST LYMPH NODE BIOPSY LEFT Left 9/16/2022     Family History   Problem Relation Age of Onset   • Hypertension Mother    • No Known Problems Father    • Lung cancer Brother      Social History     Socioeconomic History   • Marital status:       Spouse name: Not on file   • Number of children: Not on file   • Years of education: Not on file   • Highest education level: Not on file   Occupational History   • Not on file   Tobacco Use   • Smoking status: Never Smoker   • Smokeless tobacco: Never Used   Vaping Use   • Vaping Use: Never used   Substance and Sexual Activity   • Alcohol use: Not Currently   • Drug use: Never   • Sexual activity: Not on file   Other Topics Concern   • Not on file   Social History Narrative   • Not on file     Social Determinants of Health     Financial Resource Strain: Low Risk    • Difficulty of Paying Living Expenses: Not hard at all   Food Insecurity: Not on file   Transportation Needs: No Transportation Needs   • Lack of Transportation (Medical): No   • Lack of Transportation (Non-Medical):  No   Physical Activity: Not on file   Stress: Not on file   Social Connections: Not on file   Intimate Partner Violence: Not on file   Housing Stability: Not on file       Current Outpatient Medications:   •  amLODIPine (NORVASC) 10 mg tablet, Take 1 tablet by mouth daily, Disp: , Rfl:   •  ASPIRIN 81 PO, Take 81 mg by mouth 2 (two) times a day, Disp: , Rfl:   •  atenolol (TENORMIN) 50 mg tablet, Take 50 mg by mouth daily, Disp: , Rfl:   •  atorvastatin (LIPITOR) 20 mg tablet, Take 1 tablet (20 mg total) by mouth daily, Disp: 90 tablet, Rfl: 1  •  b complex vitamins capsule, Take 1 capsule by mouth daily Vitamins B1, B6, B12, Disp: , Rfl:   •  Calcium Carbonate-Vitamin D (CALCIUM-VITAMIN D3 PO), Take 1 tablet by mouth in the morning 500 mg/ 600 iu, Disp: , Rfl:   •  citalopram (CeleXA) 20 mg tablet, Take 1/2 tablet once a day for 6 days, then take 1 tablet daily, Disp: 90 tablet, Rfl: 1  •  diclofenac potassium (CATAFLAM) 50 mg tablet, Take 50 mg by mouth daily as needed Arthritis, Disp: , Rfl:   •  Empagliflozin-metFORMIN HCl 12 5-1000 MG TABS, Take 1 tablet by mouth 2 (two) times a day , Disp: , Rfl:   •  furosemide (LASIX) 40 mg tablet, Take 40 mg by mouth daily, Disp: , Rfl:   •  glipiZIDE (GLUCOTROL XL) 5 mg 24 hr tablet, Take 1 tablet (5 mg total) by mouth daily, Disp: 90 tablet, Rfl: 1  •  letrozole (FEMARA) 2 5 mg tablet, Take 2 5 mg by mouth daily, Disp: , Rfl:   •  linaGLIPtin (Tradjenta) 5 MG TABS, Take 5 mg by mouth daily, Disp: 90 tablet, Rfl: 1  •  other medication, see sig,, Medication/product name: Cinnarizine Strength: 25 mg Sig (include dose, route, frequency): po qhs, Disp: , Rfl:   •  other medication, see sig,, Medication/product name: Unicron (Gliclazide) Strength: 30 mg Sig (include dose, route, frequency): PO, BID, Disp: , Rfl:   •  oxyCODONE (ROXICODONE) 5 immediate release tablet, Take 5 mg by mouth every 4 (four) hours as needed for moderate pain or severe pain  Indications: Acute Pain, Disp: , Rfl:   •  ramipril (ALTACE) 5 mg capsule, Take 5 mg by mouth daily, Disp: , Rfl:   No Known Allergies    Physical Exams: There were no vitals filed for this visit  Physical Exam  Chest:      Comments: Examination of both mastectomy sites demonstrates no evidence of infection hematoma seroma  Her drains were only drain it proximally 20 cc a day of serous fluid  Her drains were removed without difficulty  Results & Discussion:   I explained to the patient her son that we would contact them once her pathology was available  We would do this most likely telephonically that would see her if there are any questions  We will coordinate a three-month follow-up visit

## 2022-11-09 ENCOUNTER — DOCUMENTATION (OUTPATIENT)
Dept: HEMATOLOGY ONCOLOGY | Facility: CLINIC | Age: 82
End: 2022-11-09

## 2022-11-09 NOTE — PROGRESS NOTES
In-basket message received from Trent Turner to add pt to 11/28 tumor board  Chart Reviewed and tumor board prep completed

## 2022-11-28 ENCOUNTER — DOCUMENTATION (OUTPATIENT)
Dept: HEMATOLOGY ONCOLOGY | Facility: CLINIC | Age: 82
End: 2022-11-28

## 2022-11-28 ENCOUNTER — PATIENT OUTREACH (OUTPATIENT)
Dept: CASE MANAGEMENT | Facility: OTHER | Age: 82
End: 2022-11-28

## 2022-11-28 NOTE — PROGRESS NOTES
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     GENETICS:  Not completed  Waiting on insurance    IMAGING REVIEWED:   9/16/22-mammogram diagnostic bilateral w 3d & cad  9/23/22-CT chest abdomen pelvis wo contrast    PATHOLOGY REVIEWED:  10/14/22-tissue exam-right and left breast mastectomies, left axillary contents      PHYSICIAN RECOMMENDED PLAN:  · Recommend continuing letrozole  Consider keeping patient on AI for up to 10 years  · Consider sending Oncotype Dx  · Consider offered dose reduced TC in adjuvant setting or  · Consider offering CMF x 6 cycles in adjuvant setting  · Recommend close observation of bone density with DEXA scans  · Consider obtaining genetic testing  · Recommend referral to radiation oncology-can consider a hypofractionated regimen of radiation therapy to the left chest wall      FOLLOW UP APPOINTMENTS:  1/23/23-f/u- Dr Graceann Holter surgical oncology      Team agreed to plan  The final treatment plan will be left to the discretion of the patient and the treating physician  DISCLAIMERS:  TO THE TREATING PHYSICIAN:  This conference is a meeting of clinicians from various specialty areas who evaluate and discuss patients for whom a multidisciplinary treatment approach is being considered  Please note that the above opinion was a consensus of the conference attendees and is intended only to assist in quality care of the discussed patient    The responsibility for follow up on the input given during the conference, along with any final decisions regarding plan of care, is that of the patient and the patient's provider  Accordingly, appointments have only been recommended based on this information and have NOT been scheduled unless otherwise noted  TO THE PATIENT:  This summary is a brief record of major aspects of your cancer treatment  You may choose to share a copy with any of your doctors or nurses  However, this is not a detailed or comprehensive record of your care        NCCN guidelines were readily available for review at this discussion

## 2022-11-28 NOTE — PROGRESS NOTES
OSW received VM from pt's son, Dr Cindi South requesting help in finding out about outstanding medical bills for his mother  OSW sent email to oncology finance counselors and hospital financial counselors requesting more specific information  Await response  LM for Dr Cindi South updating same

## 2022-11-29 ENCOUNTER — PATIENT OUTREACH (OUTPATIENT)
Dept: CASE MANAGEMENT | Facility: OTHER | Age: 82
End: 2022-11-29

## 2022-11-29 NOTE — PROGRESS NOTES
OSW received email that [facility] billing customer service would need to be called to discuss pt's outstanding bill  Called customer service number at [phone] and s/w Brendon Ivan  Pt has a balance of $[phone] for DOS: 9/16/22, 9/17/22, 9/30/22 and 10/4/22  OSW explained pt was active with [place] Medicaid and now has [employer] managed SACHIN Ivan explained the claims are coming back as "e-rejected" because pt's information is not matching up with the [place] system  She stated [employer] will need to be called to "e-verify" all of pt's information  Brendon Ivan double checked and noted pt's MA effective date is listed as 9/23/22 and [employer] was effective as of 9/29/22  OSW called pt's son,   Oren Simpson and explained above  He stated he will call member services to verify all information  Once this is done, OSW explained LISBETHSHANT can re-submit all of the claims to [place] for payment  He expressed appreciation for assistance

## 2022-11-30 ENCOUNTER — PATIENT OUTREACH (OUTPATIENT)
Dept: CASE MANAGEMENT | Facility: OTHER | Age: 82
End: 2022-11-30

## 2022-11-30 NOTE — PROGRESS NOTES
OSW received VM from pt's son, Dr Selin Guy he [facility] to provide correct information  He was told that the [address] assigned to his mother needs to be called because the effective date on the [place] end is not matching  He attempted to call [facility] but could not get a live person and waited on hold for 10 minutes  OSW called Elsie Davenport at [phone] and spoke with the   She was not able to provide any information without the patient present to give permission to speak to this writer  She stated pt/family can call them back at [phone] or at [phone] which is their customer service number  OSW sent message to Dr Selin Guy explaining above  Also advised he try to look for any copies of his mother's paperwork which may have a  name or number  Will follow

## 2022-12-05 ENCOUNTER — PATIENT OUTREACH (OUTPATIENT)
Dept: CASE MANAGEMENT | Facility: OTHER | Age: 82
End: 2022-12-05

## 2022-12-05 NOTE — PROGRESS NOTES
OSW spoke with pt's son, Dr Karli Tracy today  He shared he did speak to someone at [facility] and was provided a fax number to send Shahbaz's outstanding bill to for review  He confirmed with the [place] that the effective date for Shahbaz's MA appears to be incorrect  He hopes it will get straightened out soon so his mother's bills will get paid  OSW offered to send this update to hospital financial counselors and he is agreeable  Explained OSW is available for any emotional support to his mother as needed  He expressed appreciation  Email sent to [facility] financial counselor explaining bill was sent to [facility] for review

## 2022-12-08 DIAGNOSIS — E78.2 MIXED HYPERLIPIDEMIA: ICD-10-CM

## 2022-12-08 DIAGNOSIS — E11.9 TYPE 2 DIABETES MELLITUS WITHOUT COMPLICATION, WITHOUT LONG-TERM CURRENT USE OF INSULIN (HCC): Primary | ICD-10-CM

## 2022-12-17 ENCOUNTER — APPOINTMENT (OUTPATIENT)
Dept: LAB | Facility: CLINIC | Age: 82
End: 2022-12-17

## 2022-12-17 DIAGNOSIS — E78.2 MIXED HYPERLIPIDEMIA: ICD-10-CM

## 2022-12-17 DIAGNOSIS — E11.9 TYPE 2 DIABETES MELLITUS WITHOUT COMPLICATION, WITHOUT LONG-TERM CURRENT USE OF INSULIN (HCC): ICD-10-CM

## 2022-12-17 LAB
ALBUMIN SERPL BCP-MCNC: 3.7 G/DL (ref 3.5–5)
ALP SERPL-CCNC: 92 U/L (ref 46–116)
ALT SERPL W P-5'-P-CCNC: 26 U/L (ref 12–78)
ANION GAP SERPL CALCULATED.3IONS-SCNC: 8 MMOL/L (ref 4–13)
AST SERPL W P-5'-P-CCNC: 19 U/L (ref 5–45)
BASOPHILS # BLD AUTO: 0.07 THOUSANDS/ÂΜL (ref 0–0.1)
BASOPHILS NFR BLD AUTO: 1 % (ref 0–1)
BILIRUB SERPL-MCNC: 1.3 MG/DL (ref 0.2–1)
BUN SERPL-MCNC: 19 MG/DL (ref 5–25)
CALCIUM SERPL-MCNC: 10 MG/DL (ref 8.3–10.1)
CHLORIDE SERPL-SCNC: 103 MMOL/L (ref 96–108)
CHOLEST SERPL-MCNC: 214 MG/DL
CO2 SERPL-SCNC: 27 MMOL/L (ref 21–32)
CREAT SERPL-MCNC: 1.11 MG/DL (ref 0.6–1.3)
EOSINOPHIL # BLD AUTO: 0.12 THOUSAND/ÂΜL (ref 0–0.61)
EOSINOPHIL NFR BLD AUTO: 1 % (ref 0–6)
ERYTHROCYTE [DISTWIDTH] IN BLOOD BY AUTOMATED COUNT: 13.9 % (ref 11.6–15.1)
EST. AVERAGE GLUCOSE BLD GHB EST-MCNC: 163 MG/DL
GFR SERPL CREATININE-BSD FRML MDRD: 47 ML/MIN/1.73SQ M
GLUCOSE P FAST SERPL-MCNC: 188 MG/DL (ref 65–99)
HBA1C MFR BLD: 7.3 %
HCT VFR BLD AUTO: 40 % (ref 34.8–46.1)
HDLC SERPL-MCNC: 68 MG/DL
HGB BLD-MCNC: 12.4 G/DL (ref 11.5–15.4)
IMM GRANULOCYTES # BLD AUTO: 0.03 THOUSAND/UL (ref 0–0.2)
IMM GRANULOCYTES NFR BLD AUTO: 0 % (ref 0–2)
LDLC SERPL CALC-MCNC: 111 MG/DL (ref 0–100)
LYMPHOCYTES # BLD AUTO: 1.41 THOUSANDS/ÂΜL (ref 0.6–4.47)
LYMPHOCYTES NFR BLD AUTO: 17 % (ref 14–44)
MCH RBC QN AUTO: 26.7 PG (ref 26.8–34.3)
MCHC RBC AUTO-ENTMCNC: 31 G/DL (ref 31.4–37.4)
MCV RBC AUTO: 86 FL (ref 82–98)
MONOCYTES # BLD AUTO: 0.74 THOUSAND/ÂΜL (ref 0.17–1.22)
MONOCYTES NFR BLD AUTO: 9 % (ref 4–12)
NEUTROPHILS # BLD AUTO: 5.94 THOUSANDS/ÂΜL (ref 1.85–7.62)
NEUTS SEG NFR BLD AUTO: 72 % (ref 43–75)
NRBC BLD AUTO-RTO: 0 /100 WBCS
PLATELET # BLD AUTO: 308 THOUSANDS/UL (ref 149–390)
PMV BLD AUTO: 11.7 FL (ref 8.9–12.7)
POTASSIUM SERPL-SCNC: 4.2 MMOL/L (ref 3.5–5.3)
PROT SERPL-MCNC: 7.8 G/DL (ref 6.4–8.4)
RBC # BLD AUTO: 4.65 MILLION/UL (ref 3.81–5.12)
SODIUM SERPL-SCNC: 138 MMOL/L (ref 135–147)
TRIGL SERPL-MCNC: 174 MG/DL
TSH SERPL DL<=0.05 MIU/L-ACNC: 1.14 UIU/ML (ref 0.45–4.5)
WBC # BLD AUTO: 8.31 THOUSAND/UL (ref 4.31–10.16)

## 2022-12-21 ENCOUNTER — OFFICE VISIT (OUTPATIENT)
Dept: FAMILY MEDICINE CLINIC | Facility: CLINIC | Age: 82
End: 2022-12-21

## 2022-12-21 VITALS
TEMPERATURE: 97.6 F | OXYGEN SATURATION: 98 % | SYSTOLIC BLOOD PRESSURE: 132 MMHG | DIASTOLIC BLOOD PRESSURE: 70 MMHG | HEART RATE: 59 BPM | BODY MASS INDEX: 25.38 KG/M2 | HEIGHT: 60 IN | WEIGHT: 129.25 LBS

## 2022-12-21 DIAGNOSIS — I10 BENIGN ESSENTIAL HYPERTENSION: ICD-10-CM

## 2022-12-21 DIAGNOSIS — R26.2 AMBULATORY DYSFUNCTION: ICD-10-CM

## 2022-12-21 DIAGNOSIS — R19.7 DIARRHEA IN ADULT PATIENT: ICD-10-CM

## 2022-12-21 DIAGNOSIS — F41.8 DEPRESSION WITH ANXIETY: Chronic | ICD-10-CM

## 2022-12-21 DIAGNOSIS — Z23 ENCOUNTER FOR IMMUNIZATION: ICD-10-CM

## 2022-12-21 DIAGNOSIS — R00.2 PALPITATIONS: ICD-10-CM

## 2022-12-21 DIAGNOSIS — C50.112 MALIGNANT NEOPLASM OF CENTRAL PORTION OF LEFT BREAST IN FEMALE, ESTROGEN RECEPTOR POSITIVE (HCC): ICD-10-CM

## 2022-12-21 DIAGNOSIS — Z86.73 HISTORY OF CVA (CEREBROVASCULAR ACCIDENT): ICD-10-CM

## 2022-12-21 DIAGNOSIS — Z17.0 MALIGNANT NEOPLASM OF CENTRAL PORTION OF LEFT BREAST IN FEMALE, ESTROGEN RECEPTOR POSITIVE (HCC): ICD-10-CM

## 2022-12-21 DIAGNOSIS — Z23 FLU VACCINE NEED: ICD-10-CM

## 2022-12-21 DIAGNOSIS — E11.9 TYPE 2 DIABETES MELLITUS WITHOUT COMPLICATION, WITHOUT LONG-TERM CURRENT USE OF INSULIN (HCC): Primary | ICD-10-CM

## 2022-12-21 DIAGNOSIS — E78.2 MIXED HYPERLIPIDEMIA: ICD-10-CM

## 2022-12-21 RX ORDER — DICYCLOMINE HYDROCHLORIDE 10 MG/1
10 CAPSULE ORAL 3 TIMES DAILY PRN
Qty: 60 CAPSULE | Refills: 2 | Status: SHIPPED | OUTPATIENT
Start: 2022-12-21

## 2022-12-21 RX ORDER — AMLODIPINE BESYLATE 10 MG/1
10 TABLET ORAL DAILY
Qty: 90 TABLET | Refills: 3 | Status: SHIPPED | OUTPATIENT
Start: 2022-12-21

## 2022-12-21 RX ORDER — RAMIPRIL 5 MG/1
5 CAPSULE ORAL DAILY
Qty: 90 CAPSULE | Refills: 1 | Status: SHIPPED | OUTPATIENT
Start: 2022-12-21

## 2022-12-21 RX ORDER — LINAGLIPTIN 5 MG/1
5 TABLET, FILM COATED ORAL DAILY
Qty: 90 TABLET | Refills: 1 | Status: SHIPPED | OUTPATIENT
Start: 2022-12-21

## 2022-12-21 RX ORDER — ATENOLOL 50 MG/1
50 TABLET ORAL DAILY
Qty: 90 TABLET | Refills: 3 | Status: SHIPPED | OUTPATIENT
Start: 2022-12-21

## 2022-12-21 RX ORDER — GLIPIZIDE 5 MG/1
5 TABLET, FILM COATED, EXTENDED RELEASE ORAL 2 TIMES DAILY
Qty: 180 TABLET | Refills: 1 | Status: SHIPPED | OUTPATIENT
Start: 2022-12-21

## 2022-12-21 RX ORDER — ROSUVASTATIN CALCIUM 10 MG/1
10 TABLET, COATED ORAL DAILY
Qty: 90 TABLET | Refills: 3 | Status: SHIPPED | OUTPATIENT
Start: 2022-12-21

## 2022-12-21 NOTE — PROGRESS NOTES
Name: Salma Benoit      : 1940      MRN: [id number]  Encounter Provider: Dhruv Berry MD  Encounter Date: 2022   Encounter department: [address] [place] [address] Dr Sanchez  Type 2 diabetes mellitus without complication, without long-term current use of insulin ([facility])  Assessment & Plan:    Lab Results   Component Value Date    HGBA1C 7 3 (H) 2022     Reduce dose of metformin  Patient will start combination of Jardiance 12 5 metformin 1000 mg twice a day  Continue sulfonylurea, glipizide 5 mg daily or twice daily  Patient's son will titrate dose depending on blood sugars to avoid hypoglycemia  Start Tradjenta 5 mg daily  Reassess blood work in 3 months  Orders:  -     Empagliflozin-metFORMIN HCl 12 5-1000 MG TABS; Take 1 tablet by mouth 2 (two) times a day  -     glipiZIDE (GLUCOTROL XL) 5 mg 24 hr tablet; Take 1 tablet (5 mg total) by mouth 2 (two) times a day  -     linaGLIPtin (Tradjenta) 5 MG TABS; Take 5 mg by mouth daily  -     Ambulatory Referral to Ophthalmology; Future  -     CBC; Future; Expected date: 2023  -     Hemoglobin A1C; Future; Expected date: 2023  -     Microalbumin / creatinine urine ratio; Future; Expected date: 2023    2  Benign essential hypertension  Assessment & Plan:  Pressures well controlled on regimen of amlodipine, atenolol and ramipril    Orders:  -     ramipril (ALTACE) 5 mg capsule; Take 1 capsule (5 mg total) by mouth daily  -     amLODIPine (NORVASC) 10 mg tablet; Take 1 tablet (10 mg total) by mouth daily  -     atenolol (TENORMIN) 50 mg tablet; Take 1 tablet (50 mg total) by mouth daily    3  Mixed hyperlipidemia  Assessment & Plan:  Suboptimal control on atorvastatin, switch to Crestor 10 mg daily reassess in 3 months  Orders:  -     rosuvastatin (CRESTOR) 10 MG tablet; Take 1 tablet (10 mg total) by mouth daily  -     Comprehensive metabolic panel;  Future; Expected date: 2023  -     Lipid Panel with Direct LDL reflex; Future; Expected date: 03/17/2023    4  Palpitations  -     Holter monitor; Future; Expected date: 12/21/2022    5  History of CVA (cerebrovascular accident)  Assessment & Plan:  Remote history of CVA with left-sided weakness  Orders:  -     VAS carotid complete study; Future; Expected date: 12/21/2022  -     Ambulatory referral to Physical Therapy; Future    6  Ambulatory dysfunction  Assessment & Plan:  Referral to PT  Encourage patient to stay physically active  Left-sided weakness after remote CVA  Orders:  -     Ambulatory referral to Physical Therapy; Future    7  Diarrhea in adult patient  Assessment & Plan:  Likely due to high dose of metformin, currently on 3000 mg daily  Patient will reduce dose of metformin to 2000 mg daily  Trial of as needed Bentyl  Orders:  -     dicyclomine (BENTYL) 10 mg capsule; Take 1 capsule (10 mg total) by mouth 3 (three) times a day as needed (abdominal bloating / diarrhea)    8  Encounter for immunization  -     Pneumococcal Conjugate Vaccine 20-valent (Pcv20)    9  Flu vaccine need  -     influenza vaccine, high-dose, PF 0 7 mL (FLUZONE HIGH-DOSE)    10  Malignant neoplasm of central portion of left breast in female, estrogen receptor positive ([facility] [address] )  Assessment & Plan:  Status post bilateral mastectomy  Continue Femara  11  Depression with anxiety  Assessment & Plan:  Continue citalopram 20 mg daily          Follow up 3 months with labs prior    Subjective     Follow-up chronic medical conditions  Results of recent blood work reviewed  Patient is here today accompanied by her son and granddaughter  Patient has noticed worsening of abdominal bloating and occasional diarrhea, current dose of metformin is 3000 mg daily  She remains on Jardiance, sulfonylurea  No reported hypoglycemia  Blood sugars in the morning are in the 1  range  She reports intermittent symptoms of palpitations, denies chest pain    Patient has been suffering from chronic anxiety, under significant stress  She remains on citalopram 20 mg daily as our last office visit, Rx works well       Son and granddaughter are concerned about ambulatory dysfunction, patient has been quite sedentary after recent bilateral mastectomy  Her surgery went well  She is currently on Femara as per hematology oncology  Echocardiogram performed in October 2022 revealed normal ejection fraction and aortic sclerosis  Review of Systems   Constitutional: Negative  HENT: Negative  Eyes: Negative  Respiratory: Negative  Cardiovascular: Negative  Gastrointestinal: Positive for abdominal distention  Endocrine: Negative  Genitourinary: Negative  Musculoskeletal: Negative  Allergic/Immunologic: Negative  Neurological: Positive for light-headedness  Negative for syncope and headaches  Chronic dizziness, occasional lightheadedness stable   Hematological: Negative  Psychiatric/Behavioral: The patient is nervous/anxious  Chronic anxiety/insomnia symptoms  Past Medical History:   Diagnosis Date   • Diabetes mellitus ([facility] [address] )    • History of transfusion    • Hyperlipidemia    • TIA (transient ischemic attack)      Past Surgical History:   Procedure Laterality Date   • HYSTERECTOMY     • LYMPH NODE BIOPSY Left 10/14/2022    Procedure: LYMPHATIC MAPPING WITH BLUE AND RADIOACTIVE DYES (INJECT AT 0730 BY DR DOAN IN THE OR);   Surgeon: Roly Fernández MD;  [facility];  Service: Surgical Oncology   • MODIFIED RADICAL MASTECTOMY W/ AXILLARY LYMPH NODE DISSECTION Left 10/14/2022    Procedure: BREAST MODIFIED RADICAL MASTECTOMY; ROBBIE  DIRECTED;  Surgeon: Roly Fernández MD;  [facility];  Service: Surgical Oncology   • SIMPLE MASTECTOMY Right 10/14/2022    Procedure: BREAST SIMPLE MASTECTOMY;  Surgeon: Roly Fernández MD;  [facility];  Service: Surgical Oncology   • US BREAST NEEDLE LOC LEFT Left 9/16/2022   • US BREAST NEEDLE LOC RIGHT EACH ADDITIONAL Right 9/16/2022   • US GUIDED BREAST BIOPSY LEFT COMPLETE Left 9/16/2022   • US GUIDED BREAST LYMPH NODE BIOPSY LEFT Left 9/16/2022     Family History   Problem Relation Age of Onset   • Hypertension Mother    • No Known Problems Father    • Lung cancer Brother      Social History     Socioeconomic History   • Marital status:      Spouse name: None   • Number of children: None   • Years of education: None   • Highest education level: None   Occupational History   • None   Tobacco Use   • Smoking status: Never   • Smokeless tobacco: Never   Vaping Use   • Vaping Use: Never used   Substance and Sexual Activity   • Alcohol use: Not Currently   • Drug use: Never   • Sexual activity: Not Currently   Other Topics Concern   • None   Social History Narrative   • None     Social Determinants of Health     Financial Resource Strain: Low Risk    • Difficulty of Paying Living Expenses: Not hard at all   Food Insecurity: Not on file   Transportation Needs: No Transportation Needs   • Lack of Transportation (Medical): No   • Lack of Transportation (Non-Medical):  No   Physical Activity: Not on file   Stress: Not on file   Social Connections: Not on file   Intimate Partner Violence: Not on file   Housing Stability: Not on file     Current Outpatient Medications on File Prior to Visit   Medication Sig   • ASPIRIN 81 PO Take 81 mg by mouth 2 (two) times a day   • b complex vitamins capsule Take 1 capsule by mouth daily Vitamins B1, B6, B12   • Calcium Carbonate-Vitamin D (CALCIUM-VITAMIN D3 PO) Take 1 tablet by mouth in the morning 500 mg/ 600 iu   • diclofenac potassium (CATAFLAM) 50 mg tablet Take 50 mg by mouth daily as needed Arthritis   • furosemide (LASIX) 40 mg tablet Take 20 mg by mouth daily   • letrozole (FEMARA) 2 5 mg tablet Take 2 5 mg by mouth daily   • other medication, see sig, Medication/product name: Unicron (Gliclazide)  Strength: 30 mg  Sig (include dose, route, frequency): PO, BID   • citalopram (CeleXA) 20 mg tablet Take 1/2 tablet once a day for 6 days, then take 1 tablet daily (Patient not taking: Reported on 12/21/2022)   • other medication, see sig, Medication/product name: Cinnarizine  Strength: 25 mg  Sig (include dose, route, frequency): po qhs (Patient not taking: Reported on 12/21/2022)   • oxyCODONE (ROXICODONE) 5 immediate release tablet Take 5 mg by mouth every 4 (four) hours as needed for moderate pain or severe pain  Indications: Acute Pain (Patient not taking: Reported on 12/21/2022)     No Known Allergies  Immunization History   Administered Date(s) Administered   • INFLUENZA 10/25/2014   • Influenza, high dose seasonal 0 7 mL 12/21/2022   • Influenza, seasonal, injectable 10/25/2014   • Pneumococcal Conjugate Vaccine 20-valent (Pcv20), Polysace 12/21/2022   • Pneumococcal Polysaccharide PPV23 07/01/2014       Objective     /70   Pulse 59   Temp 97 6 °F (36 4 °C)   Ht 5' (1 524 m)   Wt 58 6 kg (129 lb 4 oz)   SpO2 98%   BMI 25 24 kg/m²     Physical Exam  Vitals and nursing note reviewed  Constitutional:       General: She is not in acute distress  Appearance: Normal appearance  She is well-developed  She is not ill-appearing  HENT:      Head: Normocephalic and atraumatic  Eyes:      Conjunctiva/sclera: Conjunctivae normal    Neck:      Thyroid: No thyromegaly  Vascular: No carotid bruit  Cardiovascular:      Rate and Rhythm: Normal rate and regular rhythm  Heart sounds: Murmur heard  Systolic murmur is present with a grade of 1/6  Pulmonary:      Effort: Pulmonary effort is normal  No respiratory distress  Breath sounds: Normal breath sounds  No wheezing  Abdominal:      General: Bowel sounds are increased  There is no abdominal bruit  Palpations: Abdomen is soft  Tenderness: There is no abdominal tenderness  Hernia: No hernia is present  Musculoskeletal:         General: Normal range of motion        Cervical back: Neck supple  No rigidity  Right lower leg: No edema  Left lower leg: No edema  Skin:     General: Skin is warm  Neurological:      General: No focal deficit present  Mental Status: She is alert and oriented to person, place, and time  Cranial Nerves: No cranial nerve deficit  Coordination: Coordination normal    Psychiatric:         Mood and Affect: Mood normal          Behavior: Behavior normal          Thought Content:  Thought content normal        Cynthia Sepulveda MD

## 2022-12-28 PROBLEM — R19.7 DIARRHEA IN ADULT PATIENT: Status: ACTIVE | Noted: 2022-12-28

## 2022-12-28 PROBLEM — R26.2 AMBULATORY DYSFUNCTION: Status: ACTIVE | Noted: 2022-12-28

## 2022-12-28 PROBLEM — Z86.73 HISTORY OF CVA (CEREBROVASCULAR ACCIDENT): Status: ACTIVE | Noted: 2022-12-28

## 2022-12-29 NOTE — ASSESSMENT & PLAN NOTE
Lab Results   Component Value Date    HGBA1C 7 3 (H) 12/17/2022     Reduce dose of metformin  Patient will start combination of Jardiance 12 5 metformin 1000 mg twice a day  Continue sulfonylurea, glipizide 5 mg daily or twice daily  Patient's son will titrate dose depending on blood sugars to avoid hypoglycemia  Start Tradjenta 5 mg daily  Reassess blood work in 3 months

## 2022-12-29 NOTE — ASSESSMENT & PLAN NOTE
Likely due to high dose of metformin, currently on 3000 mg daily  Patient will reduce dose of metformin to 2000 mg daily  Trial of as needed Bentyl

## 2023-01-05 ENCOUNTER — PATIENT OUTREACH (OUTPATIENT)
Dept: CASE MANAGEMENT | Facility: OTHER | Age: 83
End: 2023-01-05

## 2023-01-05 NOTE — PROGRESS NOTES
OSW spoke with oncology financial counselor regarding pt's outstanding balance  Per Sherrill Gaviria , claims were re-submitted to [facility] and pt's balance is now about $4 00  Velma Culevr stated hospital billing will adjust this balance off since pt has Medicaid  Sent update to pt's son, Dr Trupti Evans  Also inquired how his mother is feeling  He expressed appreciation for the update and for asking about his mother  Explained this writer is available for any supportive needs  Will follow peripherally, however can be available for support as needed

## 2023-01-19 PROBLEM — Z79.811 USE OF LETROZOLE (FEMARA): Status: ACTIVE | Noted: 2023-01-19

## 2023-01-23 ENCOUNTER — OFFICE VISIT (OUTPATIENT)
Dept: SURGICAL ONCOLOGY | Facility: CLINIC | Age: 83
End: 2023-01-23

## 2023-01-23 VITALS
WEIGHT: 129 LBS | HEIGHT: 60 IN | SYSTOLIC BLOOD PRESSURE: 134 MMHG | OXYGEN SATURATION: 99 % | HEART RATE: 70 BPM | BODY MASS INDEX: 25.32 KG/M2 | DIASTOLIC BLOOD PRESSURE: 76 MMHG | RESPIRATION RATE: 20 BRPM | TEMPERATURE: 97.2 F

## 2023-01-23 DIAGNOSIS — C50.912 CARCINOMA OF LEFT BREAST METASTATIC TO AXILLARY LYMPH NODE (HCC): ICD-10-CM

## 2023-01-23 DIAGNOSIS — C77.3 CARCINOMA OF LEFT BREAST METASTATIC TO AXILLARY LYMPH NODE (HCC): ICD-10-CM

## 2023-01-23 DIAGNOSIS — Z17.0 MALIGNANT NEOPLASM OF CENTRAL PORTION OF LEFT BREAST IN FEMALE, ESTROGEN RECEPTOR POSITIVE (HCC): Primary | ICD-10-CM

## 2023-01-23 DIAGNOSIS — Z79.811 USE OF LETROZOLE (FEMARA): ICD-10-CM

## 2023-01-23 DIAGNOSIS — R91.8 PULMONARY NODULES: ICD-10-CM

## 2023-01-23 DIAGNOSIS — C50.112 MALIGNANT NEOPLASM OF CENTRAL PORTION OF LEFT BREAST IN FEMALE, ESTROGEN RECEPTOR POSITIVE (HCC): Primary | ICD-10-CM

## 2023-01-23 NOTE — PROGRESS NOTES
Surgical Oncology Follow Up       [address]  [facility]  [address] [address] [facility] [address]  [facility] [address]    Shahbaz Spencer  1940  [id number]  [address] 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  She was stage III  She is treated with a modified radical mastectomy  And a right simple mastectomy  She complains of some pruritus on the left side over the last 2 days but relatively minimal   She presents with her son who translates  Clinical examination shows no evidence of local regional or distant recurrent disease  We will see her back in 6 months  She is agreeable to see our nurse practitioner at that time with the understanding that I would be available for any problems or concerns  She is also due for follow-up CAT scan of the chest for small pulmonary nodules we will coordinate that for her  Cancer History:     Oncology History   Malignant neoplasm of central portion of left breast in female, estrogen receptor positive ([facility] [address] )   8/1/2022 -  Hormone Therapy    Femara daily     9/16/2022 Biopsy    Left breast ultrasound-guided biopsy  3 o'clock, 10 cm from nipple  A  Invasive carcinoma of no special type (ductal)  Grade 2  ER 90; ME 55; HER2 2+, FISH negative; Ki-67 >10<20  Lymphovascular invasion not identified    B  Left axillary lymph node biopsy  Adenocarcinoma, morphologically consistent with breast primary    Concordant  Malignancy appears multifocal; spans 7 5 cm on mammo  Largest individual mass measures 3 cm on US  Biopsy-proven axillary level 1 metastatic adenopathy; 3 enlarged axillary LN on US  ROBBIE  reflectors placed at both biopsy sites   Right breast indeterminate findings; additional imaging and possible biopsies recommended based on surgical plan/discussion  9/30/2022 Genetic Testing    Genetic testing ordered  [facility]     10/14/2022 Surgery    Left modified radical mastectomy with lymphatic mapping  Invasive carcinoma of no special type (ductal)  Grade 3  2 4 cm (2 foci)  Lymphovascular invasion present  Margins negative  Repeat HER2 2+, FISH negative  5/5 Lymph nodes (2 5 cm with extranodal extension present)  Anatomic Stage IIIA  Prognostic Stage IIB    Right prophylactic simple mastectomy  Ductal carcinoma in situ  Grade 2  2 mm  ER 90, VA 1  Non invasive lobular neoplasia/atypical lobular hyperplasia  Margins negative           Interval History:   See above  Other than the pruritus the patient has no complaints referable to her breast or her treatment  She remains on her antihormonal therapy without difficulty  Review of Systems:   Review of Systems   Constitutional: Negative for chills and fever  HENT: Negative for ear pain and sore throat  Eyes: Negative for pain and visual disturbance  Respiratory: Negative for cough and shortness of breath  Cardiovascular: Negative for chest pain and palpitations  Gastrointestinal: Negative for abdominal pain and vomiting  Genitourinary: Negative for dysuria and hematuria  Musculoskeletal: Negative for arthralgias and back pain  Skin: Negative for color change and rash  Neurological: Negative for seizures and syncope  All other systems reviewed and are negative        Past Medical History     Patient Active Problem List   Diagnosis   • Hyperlipidemia   • Type 2 diabetes mellitus ([address] )   • Depression with anxiety   • Benign essential hypertension   • Age-related osteoporosis without current pathological fracture   • Malignant neoplasm of central portion of left breast in female, estrogen receptor positive ([address] )   • 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 ([address] )   • Ambulatory dysfunction   • Diarrhea in adult patient   • History of CVA (cerebrovascular accident)   • Use of letrozole (Femara)     Past Medical History:   Diagnosis Date   • Diabetes mellitus ([address] )    • History of transfusion    • Hyperlipidemia    • TIA (transient ischemic attack)      Past Surgical History:   Procedure Laterality Date   • HYSTERECTOMY     • LYMPH NODE BIOPSY Left 10/14/2022    Procedure: LYMPHATIC MAPPING WITH BLUE AND RADIOACTIVE DYES (INJECT AT 0730 BY DR DOAN IN THE OR); Surgeon: Norm Castellanos MD;  [facility];  Service: Surgical Oncology   • MODIFIED RADICAL MASTECTOMY W/ AXILLARY LYMPH NODE DISSECTION Left 10/14/2022    Procedure: BREAST MODIFIED RADICAL MASTECTOMY; ROBBIE  DIRECTED;  Surgeon: Norm Castellanos MD;  [facility];  Service: Surgical Oncology   • SIMPLE MASTECTOMY Right 10/14/2022    Procedure: BREAST SIMPLE MASTECTOMY;  Surgeon: Norm Castellanos MD;  [facility];  Service: Surgical Oncology   • US BREAST NEEDLE LOC LEFT Left 9/16/2022   • US BREAST NEEDLE LOC RIGHT EACH ADDITIONAL Right 9/16/2022   • US GUIDED BREAST BIOPSY LEFT COMPLETE Left 9/16/2022   • US GUIDED BREAST LYMPH NODE BIOPSY LEFT Left 9/16/2022     Family History   Problem Relation Age of Onset   • Hypertension Mother    • No Known Problems Father    • Lung cancer Brother      Social History     Socioeconomic History   • Marital status:       Spouse name: Not on file   • Number of children: Not on file   • Years of education: Not on file   • Highest education level: Not on file   Occupational History   • Not on file   Tobacco Use   • Smoking status: Never   • Smokeless tobacco: Never   Vaping Use   • Vaping Use: Never used   Substance and Sexual Activity   • Alcohol use: Not Currently   • Drug use: Never   • Sexual activity: Not Currently   Other Topics Concern   • Not on file   Social History Narrative   • Not on file     Social Determinants of Health     Financial Resource Strain: Low Risk    • Difficulty of Paying Living Expenses: Not hard at all   Food Insecurity: Not on file   Transportation Needs: No Transportation Needs   • Lack of Transportation (Medical): No   • Lack of Transportation (Non-Medical):  No   Physical Activity: Not on file   Stress: Not on file   Social Connections: Not on file   Intimate Partner Violence: Not on file   Housing Stability: Not on file       Current Outpatient Medications:   •  amLODIPine (NORVASC) 10 mg tablet, Take 1 tablet (10 mg total) by mouth daily, Disp: 90 tablet, Rfl: 3  •  ASPIRIN 81 PO, Take 81 mg by mouth 2 (two) times a day, Disp: , Rfl:   •  atenolol (TENORMIN) 50 mg tablet, Take 1 tablet (50 mg total) by mouth daily, Disp: 90 tablet, Rfl: 3  •  b complex vitamins capsule, Take 1 capsule by mouth daily Vitamins B1, B6, B12, Disp: , Rfl:   •  Calcium Carbonate-Vitamin D (CALCIUM-VITAMIN D3 PO), Take 1 tablet by mouth in the morning 500 mg/ 600 iu, Disp: , Rfl:   •  citalopram (CeleXA) 20 mg tablet, Take 1/2 tablet once a day for 6 days, then take 1 tablet daily, Disp: 90 tablet, Rfl: 1  •  diclofenac potassium (CATAFLAM) 50 mg tablet, Take 50 mg by mouth daily as needed Arthritis, Disp: , Rfl:   •  dicyclomine (BENTYL) 10 mg capsule, Take 1 capsule (10 mg total) by mouth 3 (three) times a day as needed (abdominal bloating / diarrhea), Disp: 60 capsule, Rfl: 2  •  Empagliflozin-metFORMIN HCl 12 5-1000 MG TABS, Take 1 tablet by mouth 2 (two) times a day, Disp: 180 tablet, Rfl: 1  •  furosemide (LASIX) 40 mg tablet, Take 20 mg by mouth daily, Disp: , Rfl:   •  glipiZIDE (GLUCOTROL XL) 5 mg 24 hr tablet, Take 1 tablet (5 mg total) by mouth 2 (two) times a day, Disp: 180 tablet, Rfl: 1  •  letrozole (FEMARA) 2 5 mg tablet, Take 2 5 mg by mouth daily, Disp: , Rfl:   •  linaGLIPtin (Tradjenta) 5 MG TABS, Take 5 mg by mouth daily, Disp: 90 tablet, Rfl: 1  •  other medication, see sig,, Medication/product name: Cinnarizine Strength: 25 mg Sig (include dose, route, frequency): po qhs, Disp: , Rfl: •  other medication, see sig,, Medication/product name: Unicron (Gliclazide) Strength: 30 mg Sig (include dose, route, frequency): PO, BID, Disp: , Rfl:   •  oxyCODONE (ROXICODONE) 5 immediate release tablet, Take 5 mg by mouth every 4 (four) hours as needed for moderate pain or severe pain, Disp: , Rfl:   •  ramipril (ALTACE) 5 mg capsule, Take 1 capsule (5 mg total) by mouth daily, Disp: 90 capsule, Rfl: 1  •  rosuvastatin (CRESTOR) 10 MG tablet, Take 1 tablet (10 mg total) by mouth daily, Disp: 90 tablet, Rfl: 3  No Known Allergies    Physical Exam:     Vitals:    01/23/23 1552   BP: 134/76   Pulse: 70   Resp: 20   Temp: (!) 97 2 °F (36 2 °C)   SpO2: 99%     Physical Exam  Vitals reviewed  Constitutional:       Appearance: She is well-developed  HENT:      Head: Normocephalic and atraumatic  Eyes:      Pupils: Pupils are equal, round, and reactive to light  Neck:      Thyroid: No thyromegaly  Vascular: No JVD  Trachea: No tracheal deviation  Cardiovascular:      Rate and Rhythm: Normal rate and regular rhythm  Heart sounds: Normal heart sounds  No murmur heard  No friction rub  No gallop  Pulmonary:      Effort: Pulmonary effort is normal  No respiratory distress  Breath sounds: Normal breath sounds  No wheezing or rales  Chest:      Comments: Emanation of both mastectomy sites demonstrate no worrisome skin findings no dominant masses and no axillary adenopathy  Abdominal:      General: There is no distension  Palpations: Abdomen is soft  There is no hepatomegaly or mass  Tenderness: There is no abdominal tenderness  There is no guarding or rebound  Musculoskeletal:         General: No tenderness  Normal range of motion  Cervical back: Normal range of motion and neck supple  Lymphadenopathy:      Cervical: No cervical adenopathy  Skin:     General: Skin is warm and dry  Findings: No erythema or rash     Neurological:      Mental Status: She is alert and oriented to person, place, and time  Cranial Nerves: No cranial nerve deficit  Psychiatric:         Behavior: Behavior normal            Results & Discussion:   Patient is free of any evidence of local regional or distant recurrent disease  She remains on her AI  We will coordinate a CAT scan as outlined above  We will see her back in 6 months  She is agreeable to see our nurse practitioner as outlined above  All questions were answered the patient's satisfaction as well as her signs  Advance Care Planning/Advance Directives:  I discussed the disease status, treatment plans and follow-up with the patient  Matty Mcugire), Urology  [address] [place] [address]  Phone: [phone]  Fax: [phone]

## 2023-02-02 ENCOUNTER — HOSPITAL ENCOUNTER (OUTPATIENT)
Dept: CT IMAGING | Facility: HOSPITAL | Age: 83
Discharge: HOME | End: 2023-02-02
Attending: SURGERY

## 2023-02-02 DIAGNOSIS — R91.8 PULMONARY NODULES: ICD-10-CM

## 2023-02-02 DIAGNOSIS — C77.3 CARCINOMA OF LEFT BREAST METASTATIC TO AXILLARY LYMPH NODE (HCC): ICD-10-CM

## 2023-02-02 DIAGNOSIS — C50.912 CARCINOMA OF LEFT BREAST METASTATIC TO AXILLARY LYMPH NODE (HCC): ICD-10-CM

## 2023-02-23 DIAGNOSIS — E11.9 TYPE 2 DIABETES MELLITUS WITHOUT COMPLICATION, WITHOUT LONG-TERM CURRENT USE OF INSULIN (HCC): ICD-10-CM

## 2023-02-23 DIAGNOSIS — I10 BENIGN ESSENTIAL HYPERTENSION: ICD-10-CM

## 2023-02-23 RX ORDER — LINAGLIPTIN 5 MG/1
5 TABLET, FILM COATED ORAL DAILY
Qty: 90 TABLET | Refills: 1 | Status: SHIPPED | OUTPATIENT
Start: 2023-02-23

## 2023-02-24 ENCOUNTER — TELEPHONE (OUTPATIENT)
Dept: HEMATOLOGY ONCOLOGY | Facility: CLINIC | Age: 83
End: 2023-02-24

## 2023-02-24 ENCOUNTER — DOCUMENTATION (OUTPATIENT)
Dept: HEMATOLOGY ONCOLOGY | Facility: CLINIC | Age: 83
End: 2023-02-24

## 2023-02-24 RX ORDER — RAMIPRIL 5 MG/1
5 CAPSULE ORAL DAILY
Qty: 90 CAPSULE | Refills: 1 | Status: SHIPPED | OUTPATIENT
Start: 2023-02-24

## 2023-02-24 NOTE — TELEPHONE ENCOUNTER
I spoke to the patient and explained the survivorship program  I asked if the patient would be interested in setting up an appointment  The patient was not interested at this time

## 2023-03-06 ENCOUNTER — PATIENT OUTREACH (OUTPATIENT)
Dept: CASE MANAGEMENT | Facility: HOSPITAL | Age: 83
End: 2023-03-06

## 2023-03-06 NOTE — PROGRESS NOTES
OSW s/w pt's son, Dr Audery Lesches today regarding Shahbaz's recovery from her breast surgery  He stated she is doing very well and tolerating AI treatment  OSW explained this writer is available for support as needed  Will close to ongoing outreach, but can be available for any supports in the future should pt express concerns

## 2023-03-10 LAB
LEFT EYE DIABETIC RETINOPATHY: NORMAL
RIGHT EYE DIABETIC RETINOPATHY: NORMAL

## 2023-05-11 ENCOUNTER — DOCUMENTATION (OUTPATIENT)
Dept: HEMATOLOGY ONCOLOGY | Facility: CLINIC | Age: 83
End: 2023-05-11

## 2023-05-11 NOTE — PROGRESS NOTES
recvd call from Dr Martha Morales that pt is getting bills from [facility] for dos in [address] [place] [address]. Pt has insurance. Told him that I will call there. A lot of times they don't have the currrent ins info. Told him I will get back to him  Called [facility] & spoke to [facility] she sd that they don't have the ins info   I asked her the dos & she sd that 09/26/22 $561 & 09/23/22 $2032.02 that one is w/the extended dept  I told her that the pt had straight medicaid effective 09/01/22 under recipient id# [id number]  & the [employer] became effective 11/02/22  She sd that she will submit those claims & it can take up to 45 days for them to get processed.  She sd for the pt to disregard the bill  She also sd that they will pout a hold on the acct while they wait for the payment  Called Dr Martha Morales back & gave him that if they get another bill to call me  He thanked me for the help

## 2023-07-01 ENCOUNTER — APPOINTMENT (OUTPATIENT)
Dept: LAB | Facility: CLINIC | Age: 83
End: 2023-07-01
Payer: COMMERCIAL

## 2023-07-01 DIAGNOSIS — E78.2 MIXED HYPERLIPIDEMIA: ICD-10-CM

## 2023-07-01 DIAGNOSIS — E11.9 TYPE 2 DIABETES MELLITUS WITHOUT COMPLICATION, WITHOUT LONG-TERM CURRENT USE OF INSULIN (HCC): ICD-10-CM

## 2023-07-01 LAB
ALBUMIN SERPL BCP-MCNC: 4 G/DL (ref 3.5–5)
ALP SERPL-CCNC: 103 U/L (ref 46–116)
ALT SERPL W P-5'-P-CCNC: 29 U/L (ref 12–78)
ANION GAP SERPL CALCULATED.3IONS-SCNC: 4 MMOL/L
AST SERPL W P-5'-P-CCNC: 16 U/L (ref 5–45)
BILIRUB SERPL-MCNC: 0.94 MG/DL (ref 0.2–1)
BUN SERPL-MCNC: 33 MG/DL (ref 5–25)
CALCIUM SERPL-MCNC: 9.6 MG/DL (ref 8.3–10.1)
CHLORIDE SERPL-SCNC: 106 MMOL/L (ref 96–108)
CHOLEST SERPL-MCNC: 212 MG/DL
CO2 SERPL-SCNC: 26 MMOL/L (ref 21–32)
CREAT SERPL-MCNC: 1.43 MG/DL (ref 0.6–1.3)
CREAT UR-MCNC: 30.4 MG/DL
ERYTHROCYTE [DISTWIDTH] IN BLOOD BY AUTOMATED COUNT: 14.9 % (ref 11.6–15.1)
EST. AVERAGE GLUCOSE BLD GHB EST-MCNC: 189 MG/DL
GFR SERPL CREATININE-BSD FRML MDRD: 34 ML/MIN/1.73SQ M
GLUCOSE P FAST SERPL-MCNC: 247 MG/DL (ref 65–99)
HBA1C MFR BLD: 8.2 %
HCT VFR BLD AUTO: 41.1 % (ref 34.8–46.1)
HDLC SERPL-MCNC: 59 MG/DL
HGB BLD-MCNC: 12.4 G/DL (ref 11.5–15.4)
LDLC SERPL CALC-MCNC: 118 MG/DL (ref 0–100)
MCH RBC QN AUTO: 24.8 PG (ref 26.8–34.3)
MCHC RBC AUTO-ENTMCNC: 30.2 G/DL (ref 31.4–37.4)
MCV RBC AUTO: 82 FL (ref 82–98)
MICROALBUMIN UR-MCNC: 20.9 MG/L (ref 0–20)
MICROALBUMIN/CREAT 24H UR: 69 MG/G CREATININE (ref 0–30)
PLATELET # BLD AUTO: 305 THOUSANDS/UL (ref 149–390)
PMV BLD AUTO: 12.7 FL (ref 8.9–12.7)
POTASSIUM SERPL-SCNC: 4.6 MMOL/L (ref 3.5–5.3)
PROT SERPL-MCNC: 7.8 G/DL (ref 6.4–8.4)
RBC # BLD AUTO: 4.99 MILLION/UL (ref 3.81–5.12)
SODIUM SERPL-SCNC: 136 MMOL/L (ref 135–147)
TRIGL SERPL-MCNC: 174 MG/DL
WBC # BLD AUTO: 8.86 THOUSAND/UL (ref 4.31–10.16)

## 2023-07-01 PROCEDURE — 82570 ASSAY OF URINE CREATININE: CPT

## 2023-07-01 PROCEDURE — 85027 COMPLETE CBC AUTOMATED: CPT

## 2023-07-01 PROCEDURE — 36415 COLL VENOUS BLD VENIPUNCTURE: CPT

## 2023-07-01 PROCEDURE — 80061 LIPID PANEL: CPT

## 2023-07-01 PROCEDURE — 83036 HEMOGLOBIN GLYCOSYLATED A1C: CPT

## 2023-07-01 PROCEDURE — 82043 UR ALBUMIN QUANTITATIVE: CPT

## 2023-07-01 PROCEDURE — 80053 COMPREHEN METABOLIC PANEL: CPT

## 2023-07-18 ENCOUNTER — OFFICE VISIT (OUTPATIENT)
Dept: FAMILY MEDICINE CLINIC | Facility: CLINIC | Age: 83
End: 2023-07-18
Payer: COMMERCIAL

## 2023-07-18 VITALS
SYSTOLIC BLOOD PRESSURE: 120 MMHG | BODY MASS INDEX: 27.88 KG/M2 | DIASTOLIC BLOOD PRESSURE: 60 MMHG | HEIGHT: 60 IN | TEMPERATURE: 98 F | RESPIRATION RATE: 16 BRPM | WEIGHT: 142 LBS | HEART RATE: 71 BPM | OXYGEN SATURATION: 97 %

## 2023-07-18 DIAGNOSIS — C50.112 MALIGNANT NEOPLASM OF CENTRAL PORTION OF LEFT BREAST IN FEMALE, ESTROGEN RECEPTOR POSITIVE (HCC): ICD-10-CM

## 2023-07-18 DIAGNOSIS — E11.9 TYPE 2 DIABETES MELLITUS WITHOUT COMPLICATION, WITHOUT LONG-TERM CURRENT USE OF INSULIN (HCC): Primary | ICD-10-CM

## 2023-07-18 DIAGNOSIS — N39.45 CONTINUOUS LEAKAGE OF URINE: ICD-10-CM

## 2023-07-18 DIAGNOSIS — E55.9 VITAMIN D DEFICIENCY: ICD-10-CM

## 2023-07-18 DIAGNOSIS — R14.0 ABDOMINAL DISTENSION: ICD-10-CM

## 2023-07-18 DIAGNOSIS — I10 BENIGN ESSENTIAL HYPERTENSION: ICD-10-CM

## 2023-07-18 DIAGNOSIS — E53.8 VITAMIN B12 DEFICIENCY: ICD-10-CM

## 2023-07-18 DIAGNOSIS — F41.8 DEPRESSION WITH ANXIETY: ICD-10-CM

## 2023-07-18 DIAGNOSIS — Z17.0 MALIGNANT NEOPLASM OF CENTRAL PORTION OF LEFT BREAST IN FEMALE, ESTROGEN RECEPTOR POSITIVE (HCC): ICD-10-CM

## 2023-07-18 PROCEDURE — 99214 OFFICE O/P EST MOD 30 MIN: CPT | Performed by: FAMILY MEDICINE

## 2023-07-18 RX ORDER — AMLODIPINE BESYLATE 10 MG/1
10 TABLET ORAL DAILY
Qty: 90 TABLET | Refills: 3 | Status: SHIPPED | OUTPATIENT
Start: 2023-07-18

## 2023-07-18 RX ORDER — FUROSEMIDE 20 MG/1
TABLET ORAL
COMMUNITY
Start: 2023-05-23

## 2023-07-18 RX ORDER — EMPAGLIFLOZIN AND METFORMIN HYDROCHLORIDE 12.5; 1 MG/1; MG/1
1 TABLET ORAL 2 TIMES DAILY
Qty: 180 TABLET | Refills: 1 | Status: SHIPPED | OUTPATIENT
Start: 2023-07-18

## 2023-07-18 RX ORDER — LINAGLIPTIN 5 MG/1
5 TABLET, FILM COATED ORAL DAILY
Qty: 90 TABLET | Refills: 1 | Status: SHIPPED | OUTPATIENT
Start: 2023-07-18

## 2023-07-18 RX ORDER — ATENOLOL 50 MG/1
50 TABLET ORAL DAILY
Qty: 90 TABLET | Refills: 3 | Status: SHIPPED | OUTPATIENT
Start: 2023-07-18

## 2023-07-18 RX ORDER — RAMIPRIL 5 MG/1
5 CAPSULE ORAL DAILY
Qty: 90 CAPSULE | Refills: 1 | Status: SHIPPED | OUTPATIENT
Start: 2023-07-18

## 2023-07-18 RX ORDER — GLIPIZIDE 5 MG/1
5 TABLET, FILM COATED, EXTENDED RELEASE ORAL 2 TIMES DAILY
Qty: 180 TABLET | Refills: 1 | Status: SHIPPED | OUTPATIENT
Start: 2023-07-18

## 2023-07-18 RX ORDER — CITALOPRAM 20 MG/1
TABLET ORAL
Qty: 90 TABLET | Refills: 3 | Status: SHIPPED | OUTPATIENT
Start: 2023-07-18

## 2023-07-18 NOTE — PROGRESS NOTES
Name: Efraín Cota      : 1940      MRN: [id number]  Encounter Provider: Yessy Son MD  Encounter Date: 2023   Encounter department: [address] [place] [address]     1. Type 2 diabetes mellitus without complication, without long-term current use of insulin (formerly [facility])  Assessment & Plan:    Lab Results   Component Value Date    HGBA1C 8.2 (H) 2023     Suboptimal control. Patient might have been skipping second dose of glipizide. Continue Tradjenta, metformin, Jardiance and glipizide. Reassess labs in 6 weeks    Orders:  -     Empagliflozin-metFORMIN HCl (Synjardy) 12.5-1000 MG TABS; Take 1 tablet by mouth 2 (two) times a day  -     linaGLIPtin (Tradjenta) 5 MG TABS; Take 5 mg by mouth daily  -     glipiZIDE (GLUCOTROL XL) 5 mg 24 hr tablet; Take 1 tablet (5 mg total) by mouth 2 (two) times a day    2. Continuous leakage of urine  -     Urine culture  -     Urinalysis with microscopic    3. Malignant neoplasm of central portion of left breast in female, estrogen receptor positive ([address])  Assessment & Plan: On Femara as per oncology    Orders:  -     US abdomen complete; Future; Expected date: 2023    4. Abdominal distension  -     US abdomen complete; Future; Expected date: 2023    5. Benign essential hypertension  Assessment & Plan:  Blood pressure is well controlled on regimen of amlodipine, ramipril and atenolol. Orders:  -     CBC and differential; Future  -     Comprehensive metabolic panel; Future  -     TSH, 3rd generation; Future  -     ramipril (ALTACE) 5 mg capsule; Take 1 capsule (5 mg total) by mouth daily  -     amLODIPine (NORVASC) 10 mg tablet; Take 1 tablet (10 mg total) by mouth daily  -     atenolol (TENORMIN) 50 mg tablet; Take 1 tablet (50 mg total) by mouth daily    6. Vitamin B12 deficiency  -     Vitamin B12; Future    7. Vitamin D deficiency  -     Vitamin D 25 hydroxy; Future    8.  Depression with anxiety  -     citalopram (CeleXA) 20 mg tablet; Take 1 tablet daily      Proceed with UA C&S to rule out occult UTI. Patient has been experiencing fatigue possibly due to Femara. Rx will be on hold for a few weeks as per oncology. Proceed with abdominal ultrasound to rule out ascites, exam is benign. Patient denies abdominal pain or dyspepsia. We discussed importance of fluid intake and Rx adherence. Repeat labs in 6 weeks. Subjective     Follow-up chronic medical conditions. Results of recent blood work performed on July 1 reviewed. Patient is here accompanied by her son. She has been experiencing fatigue. No chest pain, palpitations, shortness of breath or dizziness. She remains on Femara, oncology is planning to hold for a few weeks due to possible ASE. Chronic urinary leakage. No dysuria. Suboptimal control of diabetes. BUN 33, creatinine 1.43. Hemoglobin A1c 8.2, fasting blood glucose 247. Patient and her son prefer to discontinue Crestor due to advanced age and possible side effects of myalgias/arthralgias. Anxiety symptoms have improved on citalopram.    Patient has been sleeping well. Patient's son has noticed increased truncal obesity. Patient denies symptoms of abdominal pain or distention. Review of Systems   Constitutional: Positive for fatigue. HENT: Negative. Eyes: Negative. Respiratory: Negative. Cardiovascular: Negative. Gastrointestinal: Negative. Endocrine: Negative. Genitourinary: Positive for frequency. Negative for dysuria. As per HPI   Musculoskeletal: Positive for arthralgias. Allergic/Immunologic: Negative. Neurological: Negative. Hematological: Negative. Psychiatric/Behavioral: Negative.         Past Medical History:   Diagnosis Date   • Diabetes mellitus ([address])    • History of transfusion    • Hyperlipidemia    • TIA (transient ischemic attack)      Past Surgical History:   Procedure Laterality Date   • HYSTERECTOMY     • LYMPH NODE BIOPSY Left 10/14/2022 Procedure: LYMPHATIC MAPPING WITH BLUE AND RADIOACTIVE DYES (INJECT AT 0730 BY DR DOAN IN THE OR); Surgeon: Halle Evans MD;  [facility];  Service: Surgical Oncology   • MODIFIED RADICAL MASTECTOMY W/ AXILLARY LYMPH NODE DISSECTION Left 10/14/2022    Procedure: BREAST MODIFIED RADICAL MASTECTOMY; ROBBIE  DIRECTED;  Surgeon: Halle Evasn MD;  [facility];  Service: Surgical Oncology   • SIMPLE MASTECTOMY Right 10/14/2022    Procedure: BREAST SIMPLE MASTECTOMY;  Surgeon: Halle Evans MD;  [facility];  Service: Surgical Oncology   • US BREAST NEEDLE LOC LEFT Left 9/16/2022   • US BREAST NEEDLE LOC RIGHT EACH ADDITIONAL Right 9/16/2022   • US GUIDED BREAST BIOPSY LEFT COMPLETE Left 9/16/2022   • US GUIDED BREAST LYMPH NODE BIOPSY LEFT Left 9/16/2022     Family History   Problem Relation Age of Onset   • Hypertension Mother    • No Known Problems Father    • Lung cancer Brother      Social History     Socioeconomic History   • Marital status:      Spouse name: None   • Number of children: None   • Years of education: None   • Highest education level: None   Occupational History   • None   Tobacco Use   • Smoking status: Never   • Smokeless tobacco: Never   Vaping Use   • Vaping Use: Never used   Substance and Sexual Activity   • Alcohol use: Not Currently   • Drug use: Never   • Sexual activity: Not Currently   Other Topics Concern   • None   Social History Narrative   • None     Social Determinants of Health     Financial Resource Strain: Low Risk  (9/14/2022)    Overall Financial Resource Strain (CARDIA)    • Difficulty of Paying Living Expenses: Not hard at all   Food Insecurity: Not on file   Transportation Needs: No Transportation Needs (9/14/2022)    PRAPARE - Transportation    • Lack of Transportation (Medical): No    • Lack of Transportation (Non-Medical):  No   Physical Activity: Not on file   Stress: Not on file   Social Connections: Not on file   Intimate Partner Violence: Not on file   Housing Stability: Not on file     Current Outpatient Medications on File Prior to Visit   Medication Sig   • ASPIRIN 81 PO Take 81 mg by mouth 2 (two) times a day   • Calcium Carbonate-Vitamin D (CALCIUM-VITAMIN D3 PO) Take 1 tablet by mouth in the morning 500 mg/ 600 iu   • diclofenac potassium (CATAFLAM) 50 mg tablet Take 50 mg by mouth daily as needed Arthritis   • furosemide (LASIX) 20 mg tablet      No Known Allergies  Immunization History   Administered Date(s) Administered   • INFLUENZA 10/25/2014, 12/21/2022   • Influenza, high dose seasonal 0.7 mL 12/21/2022   • Influenza, seasonal, injectable 10/25/2014   • Pneumococcal Conjugate Vaccine 20-valent (Pcv20), Polysace 12/21/2022   • Pneumococcal Polysaccharide PPV23 07/01/2014       Objective     /60   Pulse 71   Temp 98 °F (36.7 °C) (Temporal)   Resp 16   Ht 5' (1.524 m)   Wt 64.4 kg (142 lb)   SpO2 97%   BMI 27.73 kg/m²     Physical Exam  Vitals and nursing note reviewed. Constitutional:       General: She is not in acute distress. Appearance: Normal appearance. She is well-developed. She is not ill-appearing. HENT:      Head: Normocephalic and atraumatic. Eyes:      Conjunctiva/sclera: Conjunctivae normal.   Neck:      Thyroid: No thyromegaly. Vascular: No carotid bruit. Cardiovascular:      Rate and Rhythm: Normal rate and regular rhythm. Heart sounds: Normal heart sounds. No murmur heard. Pulmonary:      Effort: Pulmonary effort is normal. No respiratory distress. Breath sounds: Normal breath sounds. No wheezing. Abdominal:      General: Bowel sounds are normal. There is no distension or abdominal bruit. Palpations: Abdomen is soft. Tenderness: There is no abdominal tenderness. Musculoskeletal:         General: Normal range of motion. Cervical back: Neck supple. No rigidity. Neurological:      Mental Status: She is alert and oriented to person, place, and time.       Cranial Nerves: No cranial nerve deficit. Coordination: Coordination normal.   Psychiatric:         Mood and Affect: Mood normal.         Behavior: Behavior normal.         Thought Content:  Thought content normal.       Tasha Albert MD

## 2023-07-24 NOTE — ASSESSMENT & PLAN NOTE
Lab Results   Component Value Date    HGBA1C 8.2 (H) 07/01/2023     Suboptimal control. Patient might have been skipping second dose of glipizide. Continue Tradjenta, metformin, Jardiance and glipizide.  Reassess labs in 6 weeks

## 2024-01-04 DIAGNOSIS — E11.9 TYPE 2 DIABETES MELLITUS WITHOUT COMPLICATION, WITHOUT LONG-TERM CURRENT USE OF INSULIN (HCC): ICD-10-CM

## 2024-01-04 DIAGNOSIS — I10 BENIGN ESSENTIAL HYPERTENSION: ICD-10-CM

## 2024-01-04 RX ORDER — EMPAGLIFLOZIN AND METFORMIN HYDROCHLORIDE 12.5; 1 MG/1; MG/1
1 TABLET ORAL 2 TIMES DAILY
Qty: 180 TABLET | Refills: 3 | Status: SHIPPED | OUTPATIENT
Start: 2024-01-04

## 2024-01-04 RX ORDER — RAMIPRIL 5 MG/1
5 CAPSULE ORAL DAILY
Qty: 90 CAPSULE | Refills: 3 | Status: SHIPPED | OUTPATIENT
Start: 2024-01-04

## 2024-01-04 RX ORDER — LINAGLIPTIN 5 MG/1
5 TABLET, FILM COATED ORAL DAILY
Qty: 90 TABLET | Refills: 3 | Status: SHIPPED | OUTPATIENT
Start: 2024-01-04

## 2024-01-04 RX ORDER — AMLODIPINE BESYLATE 10 MG/1
10 TABLET ORAL DAILY
Qty: 90 TABLET | Refills: 3 | Status: SHIPPED | OUTPATIENT
Start: 2024-01-04

## 2024-01-04 RX ORDER — GLIPIZIDE 5 MG/1
5 TABLET, FILM COATED, EXTENDED RELEASE ORAL 2 TIMES DAILY
Qty: 180 TABLET | Refills: 3 | Status: SHIPPED | OUTPATIENT
Start: 2024-01-04

## 2024-01-04 RX ORDER — ATENOLOL 50 MG/1
50 TABLET ORAL DAILY
Qty: 90 TABLET | Refills: 3 | Status: SHIPPED | OUTPATIENT
Start: 2024-01-04

## 2024-05-01 DIAGNOSIS — I10 BENIGN ESSENTIAL HYPERTENSION: ICD-10-CM

## 2024-05-02 RX ORDER — ATENOLOL 50 MG/1
50 TABLET ORAL DAILY
Qty: 90 TABLET | Refills: 0 | Status: SHIPPED | OUTPATIENT
Start: 2024-05-02

## 2024-05-02 NOTE — TELEPHONE ENCOUNTER
Patient needs an appointment. Please contact the patient to schedule an appointment. Courtesy refill provided.   Physical 7/19 or after

## 2024-07-06 ENCOUNTER — APPOINTMENT (OUTPATIENT)
Dept: LAB | Facility: CLINIC | Age: 84
End: 2024-07-06
Payer: COMMERCIAL

## 2024-07-06 DIAGNOSIS — E55.9 VITAMIN D DEFICIENCY: ICD-10-CM

## 2024-07-06 DIAGNOSIS — E53.8 VITAMIN B12 DEFICIENCY: ICD-10-CM

## 2024-07-06 DIAGNOSIS — E11.9 TYPE 2 DIABETES MELLITUS WITHOUT COMPLICATION, WITHOUT LONG-TERM CURRENT USE OF INSULIN (HCC): Primary | Chronic | ICD-10-CM

## 2024-07-06 DIAGNOSIS — I10 BENIGN ESSENTIAL HYPERTENSION: ICD-10-CM

## 2024-07-06 LAB
25(OH)D3 SERPL-MCNC: 33.7 NG/ML (ref 30–100)
ALBUMIN SERPL BCG-MCNC: 4.2 G/DL (ref 3.5–5)
ALP SERPL-CCNC: 86 U/L (ref 34–104)
ALT SERPL W P-5'-P-CCNC: 14 U/L (ref 7–52)
ANION GAP SERPL CALCULATED.3IONS-SCNC: 12 MMOL/L (ref 4–13)
AST SERPL W P-5'-P-CCNC: 13 U/L (ref 13–39)
BASOPHILS # BLD AUTO: 0.08 THOUSANDS/ÂΜL (ref 0–0.1)
BASOPHILS NFR BLD AUTO: 1 % (ref 0–1)
BILIRUB SERPL-MCNC: 0.7 MG/DL (ref 0.2–1)
BUN SERPL-MCNC: 20 MG/DL (ref 5–25)
CALCIUM SERPL-MCNC: 10.1 MG/DL (ref 8.4–10.2)
CHLORIDE SERPL-SCNC: 97 MMOL/L (ref 96–108)
CO2 SERPL-SCNC: 26 MMOL/L (ref 21–32)
CREAT SERPL-MCNC: 0.96 MG/DL (ref 0.6–1.3)
EOSINOPHIL # BLD AUTO: 0.17 THOUSAND/ÂΜL (ref 0–0.61)
EOSINOPHIL NFR BLD AUTO: 2 % (ref 0–6)
ERYTHROCYTE [DISTWIDTH] IN BLOOD BY AUTOMATED COUNT: 14.4 % (ref 11.6–15.1)
EST. AVERAGE GLUCOSE BLD GHB EST-MCNC: 278 MG/DL
GFR SERPL CREATININE-BSD FRML MDRD: 55 ML/MIN/1.73SQ M
GLUCOSE P FAST SERPL-MCNC: 377 MG/DL (ref 65–99)
HBA1C MFR BLD: 11.3 %
HCT VFR BLD AUTO: 40.2 % (ref 34.8–46.1)
HGB BLD-MCNC: 12.7 G/DL (ref 11.5–15.4)
IMM GRANULOCYTES # BLD AUTO: 0.03 THOUSAND/UL (ref 0–0.2)
IMM GRANULOCYTES NFR BLD AUTO: 0 % (ref 0–2)
LYMPHOCYTES # BLD AUTO: 1.79 THOUSANDS/ÂΜL (ref 0.6–4.47)
LYMPHOCYTES NFR BLD AUTO: 26 % (ref 14–44)
MCH RBC QN AUTO: 25.3 PG (ref 26.8–34.3)
MCHC RBC AUTO-ENTMCNC: 31.6 G/DL (ref 31.4–37.4)
MCV RBC AUTO: 80 FL (ref 82–98)
MONOCYTES # BLD AUTO: 0.65 THOUSAND/ÂΜL (ref 0.17–1.22)
MONOCYTES NFR BLD AUTO: 9 % (ref 4–12)
NEUTROPHILS # BLD AUTO: 4.22 THOUSANDS/ÂΜL (ref 1.85–7.62)
NEUTS SEG NFR BLD AUTO: 62 % (ref 43–75)
NRBC BLD AUTO-RTO: 0 /100 WBCS
PLATELET # BLD AUTO: 270 THOUSANDS/UL (ref 149–390)
PMV BLD AUTO: 12.4 FL (ref 8.9–12.7)
POTASSIUM SERPL-SCNC: 4 MMOL/L (ref 3.5–5.3)
PROT SERPL-MCNC: 7.5 G/DL (ref 6.4–8.4)
RBC # BLD AUTO: 5.02 MILLION/UL (ref 3.81–5.12)
SODIUM SERPL-SCNC: 135 MMOL/L (ref 135–147)
TSH SERPL DL<=0.05 MIU/L-ACNC: 0.91 UIU/ML (ref 0.45–4.5)
VIT B12 SERPL-MCNC: 239 PG/ML (ref 180–914)
WBC # BLD AUTO: 6.94 THOUSAND/UL (ref 4.31–10.16)

## 2024-07-06 PROCEDURE — 85025 COMPLETE CBC W/AUTO DIFF WBC: CPT

## 2024-07-06 PROCEDURE — 83036 HEMOGLOBIN GLYCOSYLATED A1C: CPT | Performed by: INTERNAL MEDICINE

## 2024-07-06 PROCEDURE — 36415 COLL VENOUS BLD VENIPUNCTURE: CPT

## 2024-07-06 PROCEDURE — 84443 ASSAY THYROID STIM HORMONE: CPT

## 2024-07-06 PROCEDURE — 80053 COMPREHEN METABOLIC PANEL: CPT

## 2024-07-06 PROCEDURE — 82607 VITAMIN B-12: CPT

## 2024-07-06 PROCEDURE — 82306 VITAMIN D 25 HYDROXY: CPT

## 2024-07-12 ENCOUNTER — OFFICE VISIT (OUTPATIENT)
Dept: FAMILY MEDICINE CLINIC | Facility: CLINIC | Age: 84
End: 2024-07-12
Payer: COMMERCIAL

## 2024-07-12 VITALS
RESPIRATION RATE: 16 BRPM | WEIGHT: 135.4 LBS | OXYGEN SATURATION: 98 % | HEART RATE: 63 BPM | BODY MASS INDEX: 26.58 KG/M2 | DIASTOLIC BLOOD PRESSURE: 58 MMHG | TEMPERATURE: 97.6 F | HEIGHT: 60 IN | SYSTOLIC BLOOD PRESSURE: 120 MMHG

## 2024-07-12 DIAGNOSIS — I10 BENIGN ESSENTIAL HYPERTENSION: ICD-10-CM

## 2024-07-12 DIAGNOSIS — K08.109 EDENTULOUS: ICD-10-CM

## 2024-07-12 DIAGNOSIS — E11.9 TYPE 2 DIABETES MELLITUS WITHOUT COMPLICATION, WITHOUT LONG-TERM CURRENT USE OF INSULIN (HCC): Primary | Chronic | ICD-10-CM

## 2024-07-12 DIAGNOSIS — E53.8 VITAMIN B12 DEFICIENCY: ICD-10-CM

## 2024-07-12 DIAGNOSIS — F41.8 DEPRESSION WITH ANXIETY: Chronic | ICD-10-CM

## 2024-07-12 DIAGNOSIS — E78.2 MIXED HYPERLIPIDEMIA: Chronic | ICD-10-CM

## 2024-07-12 PROCEDURE — 99214 OFFICE O/P EST MOD 30 MIN: CPT | Performed by: FAMILY MEDICINE

## 2024-07-12 RX ORDER — DULAGLUTIDE 0.75 MG/.5ML
0.75 INJECTION, SOLUTION SUBCUTANEOUS
Qty: 6 ML | Refills: 1 | Status: SHIPPED | OUTPATIENT
Start: 2024-07-12

## 2024-07-12 RX ORDER — LETROZOLE 2.5 MG/1
2.5 TABLET, FILM COATED ORAL DAILY
COMMUNITY

## 2024-07-12 RX ORDER — ATENOLOL 25 MG/1
25 TABLET ORAL DAILY
Qty: 90 TABLET | Refills: 3 | Status: SHIPPED | OUTPATIENT
Start: 2024-07-12

## 2024-07-12 RX ORDER — GLIPIZIDE 5 MG/1
5 TABLET, FILM COATED, EXTENDED RELEASE ORAL DAILY
Qty: 90 TABLET | Refills: 3 | Status: SHIPPED | OUTPATIENT
Start: 2024-07-12

## 2024-07-12 NOTE — PROGRESS NOTES
Ambulatory Visit  Name: Shahbaz Spencer      : 1940      MRN: [id number]  Encounter Provider: oSfya Ross MD  Encounter Date: 2024   Encounter department: [facility]    Assessment & Plan   1. Type 2 diabetes mellitus without complication, without long-term current use of insulin (HCC)  Assessment & Plan:    Lab Results   Component Value Date    HGBA1C 11.3 (H) 2024   Discontinue Tradjenta  Continue metformin and Jardiance  Restart glipizide ER 5 mg daily  Start Trulicity 0.75 mg weekly, dose could be adjusted based on blood sugars.  Patient's son will keep me posted.  Reassess labs in 3 months.  Patient is up-to-date with diabetic eye exam  Orders:  -     dulaglutide (Trulicity) 0.75 MG/0.5ML injection; Inject 0.5 mL (0.75 mg total) under the skin every 7 days  -     glipiZIDE (GLUCOTROL XL) 5 mg 24 hr tablet; Take 1 tablet (5 mg total) by mouth daily  -     Albumin / creatinine urine ratio; Future; Expected date: 2024  -     Hemoglobin A1c (w/out EAG) ([facility] ONLY); Future; Expected date: 2024  -     Comprehensive metabolic panel; Future; Expected date: 2024  -     Hemoglobin A1c (w/out EAG) ([facility] ONLY)  2. Benign essential hypertension  Assessment & Plan:  Blood pressure is on the lower side.  Patient has been experiencing orthostatic symptoms.  I recommend to reduce dose of atenolol from 50 to 25 mg daily.  Continue ramipril 5 mg once a day and amlodipine 10 mg daily  Orders:  -     atenolol (TENORMIN) 25 mg tablet; Take 1 tablet (25 mg total) by mouth daily  3. Mixed hyperlipidemia  -     Lipid Panel with Direct LDL reflex; Future; Expected date: 2024  4. Vitamin B12 deficiency  -     Cyanocobalamin 1000 MCG SUBL; Take 1 tablet daily under the tongue  5. Edentulous  Assessment & Plan:  Continue soft diet  6. Depression with anxiety  Assessment & Plan:  Continue citalopram 20 mg daily       Patient Instructions   Tenormin 25 mg daily  Stop  Ushajenta  Glipizide ER 5 mg daily   B12 1000 mcg daily  Trulicity 0.75 mg weekly  Labs as of 10/7/24        History of Present Illness     Follow-up chronic medical conditions.  Patient is here today accompanied by her son, Jaqueline Herrera, one of my colleagues   Results of recent blood work reviewed.  Hemoglobin A1c is high.  Patient reportedly has been compliant with all medications aside from glipizide   She is UTD with  eye exam  Patient is a dentulous, limited diet, she has been enjoying up to 5 bananas a day (needs soft food)  7 pound weight loss since our last office visit in July 2023.    Patient's son reports intermittent symptoms of orthostasis    Medications updated.  Blood work results indicate low level of vitamin B12, high fasting glucose of 370 and hemoglobin A1c of 11.3        Review of Systems   Constitutional: Negative.    HENT: Negative.     Eyes: Negative.    Respiratory: Negative.     Cardiovascular: Negative.    Gastrointestinal: Negative.    Endocrine: Negative.    Genitourinary: Negative.    Musculoskeletal: Negative.    Allergic/Immunologic: Negative.    Neurological:  Positive for light-headedness.   Hematological: Negative.    Psychiatric/Behavioral: Negative.       Past Medical History:   Diagnosis Date   • Diabetes mellitus (HCC)    • History of transfusion    • Hyperlipidemia    • TIA (transient ischemic attack)      Past Surgical History:   Procedure Laterality Date   • HYSTERECTOMY     • LYMPH NODE BIOPSY Left 10/14/2022    Procedure: LYMPHATIC MAPPING WITH BLUE AND RADIOACTIVE DYES (INJECT AT 0730 BY DR MILES IN THE OR);  Surgeon: Vernon Miles MD;  [facility];  Service: Surgical Oncology   • MODIFIED RADICAL MASTECTOMY W/ AXILLARY LYMPH NODE DISSECTION Left 10/14/2022    Procedure: BREAST MODIFIED RADICAL MASTECTOMY; ROBBIE  DIRECTED;  Surgeon: Vernon Miles MD;  [facility];  Service: Surgical Oncology   • SIMPLE MASTECTOMY Right 10/14/2022    Procedure: BREAST SIMPLE  MASTECTOMY;  Surgeon: Vernon Miles MD;  [facility];  Service: Surgical Oncology   • US BREAST NEEDLE LOC LEFT Left 9/16/2022   • US BREAST NEEDLE LOC RIGHT EACH ADDITIONAL Right 9/16/2022   • US GUIDED BREAST BIOPSY LEFT COMPLETE Left 9/16/2022   • US GUIDED BREAST LYMPH NODE BIOPSY LEFT Left 9/16/2022     Family History   Problem Relation Age of Onset   • Hypertension Mother    • No Known Problems Father    • Lung cancer Brother      Social History     Tobacco Use   • Smoking status: Never   • Smokeless tobacco: Never   Vaping Use   • Vaping status: Never Used   Substance and Sexual Activity   • Alcohol use: Not Currently   • Drug use: Never   • Sexual activity: Not Currently     Current Outpatient Medications on File Prior to Visit   Medication Sig   • amLODIPine (NORVASC) 10 mg tablet Take 1 tablet (10 mg total) by mouth daily   • ASPIRIN 81 PO Take 81 mg by mouth 2 (two) times a day   • Calcium Carbonate-Vitamin D (CALCIUM-VITAMIN D3 PO) Take 1 tablet by mouth in the morning 500 mg/ 600 iu   • citalopram (CeleXA) 20 mg tablet Take 1 tablet daily   • Empagliflozin-metFORMIN HCl (Synjardy) 12.5-1000 MG TABS Take 1 tablet by mouth 2 (two) times a day   • furosemide (LASIX) 20 mg tablet    • letrozole (FEMARA) 2.5 mg tablet Take 2.5 mg by mouth daily   • ramipril (ALTACE) 5 mg capsule Take 1 capsule (5 mg total) by mouth daily     No Known Allergies  Immunization History   Administered Date(s) Administered   • INFLUENZA 10/25/2014, 12/21/2022   • Influenza, high dose seasonal 0.7 mL 12/21/2022   • Influenza, seasonal, injectable 10/25/2014   • Pneumococcal Conjugate Vaccine 20-valent (Pcv20), Polysace 12/21/2022   • Pneumococcal Polysaccharide PPV23 07/01/2014     Objective     /58 (BP Location: Right arm, Patient Position: Sitting, Cuff Size: Standard)   Pulse 63   Temp 97.6 °F (36.4 °C) (Temporal)   Resp 16   Ht 5' (1.524 m)   Wt 61.4 kg (135 lb 6.4 oz)   SpO2 98%   BMI 26.44 kg/m²      Physical Exam  Vitals and nursing note reviewed.   Constitutional:       General: She is not in acute distress.     Appearance: Normal appearance. She is well-developed. She is not ill-appearing.   HENT:      Head: Normocephalic and atraumatic.   Eyes:      General: No scleral icterus.     Conjunctiva/sclera: Conjunctivae normal.   Neck:      Vascular: No carotid bruit.   Cardiovascular:      Rate and Rhythm: Normal rate and regular rhythm.      Heart sounds: Normal heart sounds. No murmur heard.  Pulmonary:      Effort: Pulmonary effort is normal. No respiratory distress.      Breath sounds: Normal breath sounds.   Abdominal:      General: Bowel sounds are normal. There is no distension or abdominal bruit.      Palpations: Abdomen is soft.      Tenderness: There is no abdominal tenderness.      Hernia: No hernia is present.   Musculoskeletal:      Cervical back: Neck supple. No rigidity.      Right lower leg: No edema.      Left lower leg: No edema.   Skin:     General: Skin is warm.   Neurological:      General: No focal deficit present.      Mental Status: She is alert and oriented to person, place, and time.   Psychiatric:         Mood and Affect: Mood normal.         Behavior: Behavior normal.         Thought Content: Thought content normal.

## 2024-07-12 NOTE — PATIENT INSTRUCTIONS
Tenormin 25 mg daily  Stop Tradjenta  Glipizide ER 5 mg daily   B12 1000 mcg daily  Trulicity 0.75 mg weekly  Labs as of 10/7/24

## 2024-07-16 PROBLEM — K08.109 EDENTULOUS: Status: ACTIVE | Noted: 2024-07-16

## 2024-07-16 NOTE — ASSESSMENT & PLAN NOTE
Lab Results   Component Value Date    HGBA1C 11.3 (H) 07/06/2024   Discontinue Tradjenta  Continue metformin and Jardiance  Restart glipizide ER 5 mg daily  Start Trulicity 0.75 mg weekly, dose could be adjusted based on blood sugars.  Patient's son will keep me posted.  Reassess labs in 3 months.  Patient is up-to-date with diabetic eye exam

## 2024-07-16 NOTE — ASSESSMENT & PLAN NOTE
Blood pressure is on the lower side.  Patient has been experiencing orthostatic symptoms.  I recommend to reduce dose of atenolol from 50 to 25 mg daily.  Continue ramipril 5 mg once a day and amlodipine 10 mg daily

## 2024-08-23 DIAGNOSIS — E11.9 TYPE 2 DIABETES MELLITUS WITHOUT COMPLICATION, WITHOUT LONG-TERM CURRENT USE OF INSULIN (HCC): Chronic | ICD-10-CM

## 2024-08-23 RX ORDER — LINAGLIPTIN 5 MG/1
5 TABLET, FILM COATED ORAL DAILY
Qty: 100 TABLET | Refills: 3 | Status: SHIPPED | OUTPATIENT
Start: 2024-08-23

## 2024-08-23 RX ORDER — GLIPIZIDE 5 MG/1
5 TABLET, FILM COATED, EXTENDED RELEASE ORAL DAILY
Qty: 100 TABLET | Refills: 3 | Status: SHIPPED | OUTPATIENT
Start: 2024-08-23

## 2024-08-23 RX ORDER — EMPAGLIFLOZIN AND METFORMIN HYDROCHLORIDE 12.5; 1 MG/1; MG/1
1 TABLET ORAL 2 TIMES DAILY
Qty: 200 TABLET | Refills: 3 | Status: SHIPPED | OUTPATIENT
Start: 2024-08-23

## 2024-08-23 RX ORDER — LINAGLIPTIN 5 MG/1
5 TABLET, FILM COATED ORAL DAILY
COMMUNITY
End: 2024-08-23 | Stop reason: SDUPTHER

## 2024-09-04 ENCOUNTER — TELEPHONE (OUTPATIENT)
Dept: FAMILY MEDICINE CLINIC | Facility: CLINIC | Age: 84
End: 2024-09-04

## 2024-10-18 ENCOUNTER — OFFICE VISIT (OUTPATIENT)
Dept: FAMILY MEDICINE CLINIC | Facility: CLINIC | Age: 84
End: 2024-10-18
Payer: COMMERCIAL

## 2024-10-18 ENCOUNTER — APPOINTMENT (OUTPATIENT)
Dept: LAB | Facility: CLINIC | Age: 84
End: 2024-10-18
Payer: COMMERCIAL

## 2024-10-18 VITALS
SYSTOLIC BLOOD PRESSURE: 126 MMHG | RESPIRATION RATE: 16 BRPM | BODY MASS INDEX: 26.68 KG/M2 | HEART RATE: 62 BPM | WEIGHT: 136.6 LBS | TEMPERATURE: 98 F | DIASTOLIC BLOOD PRESSURE: 62 MMHG | OXYGEN SATURATION: 98 %

## 2024-10-18 DIAGNOSIS — E78.2 MIXED HYPERLIPIDEMIA: Chronic | ICD-10-CM

## 2024-10-18 DIAGNOSIS — E78.2 MIXED HYPERLIPIDEMIA: Primary | Chronic | ICD-10-CM

## 2024-10-18 DIAGNOSIS — E53.8 VITAMIN B12 DEFICIENCY: ICD-10-CM

## 2024-10-18 DIAGNOSIS — C77.3 CARCINOMA OF LEFT BREAST METASTATIC TO AXILLARY LYMPH NODE (HCC): ICD-10-CM

## 2024-10-18 DIAGNOSIS — I10 BENIGN ESSENTIAL HYPERTENSION: ICD-10-CM

## 2024-10-18 DIAGNOSIS — E11.9 TYPE 2 DIABETES MELLITUS WITHOUT COMPLICATION, WITHOUT LONG-TERM CURRENT USE OF INSULIN (HCC): Chronic | ICD-10-CM

## 2024-10-18 DIAGNOSIS — C50.912 CARCINOMA OF LEFT BREAST METASTATIC TO AXILLARY LYMPH NODE (HCC): ICD-10-CM

## 2024-10-18 DIAGNOSIS — F41.8 DEPRESSION WITH ANXIETY: Chronic | ICD-10-CM

## 2024-10-18 LAB
ALBUMIN SERPL BCG-MCNC: 4.3 G/DL (ref 3.5–5)
ALP SERPL-CCNC: 83 U/L (ref 34–104)
ALT SERPL W P-5'-P-CCNC: 10 U/L (ref 7–52)
ANION GAP SERPL CALCULATED.3IONS-SCNC: 10 MMOL/L (ref 4–13)
AST SERPL W P-5'-P-CCNC: 10 U/L (ref 13–39)
BILIRUB SERPL-MCNC: 0.85 MG/DL (ref 0.2–1)
BUN SERPL-MCNC: 31 MG/DL (ref 5–25)
CALCIUM SERPL-MCNC: 10.1 MG/DL (ref 8.4–10.2)
CHLORIDE SERPL-SCNC: 103 MMOL/L (ref 96–108)
CHOLEST SERPL-MCNC: 213 MG/DL
CO2 SERPL-SCNC: 28 MMOL/L (ref 21–32)
CREAT SERPL-MCNC: 1.04 MG/DL (ref 0.6–1.3)
CREAT UR-MCNC: 53.4 MG/DL
EST. AVERAGE GLUCOSE BLD GHB EST-MCNC: 189 MG/DL
GFR SERPL CREATININE-BSD FRML MDRD: 50 ML/MIN/1.73SQ M
GLUCOSE P FAST SERPL-MCNC: 231 MG/DL (ref 65–99)
HBA1C MFR BLD: 8.2 %
HDLC SERPL-MCNC: 58 MG/DL
LDLC SERPL CALC-MCNC: 101 MG/DL (ref 0–100)
MICROALBUMIN UR-MCNC: 17.9 MG/L
MICROALBUMIN/CREAT 24H UR: 34 MG/G CREATININE (ref 0–30)
POTASSIUM SERPL-SCNC: 4.3 MMOL/L (ref 3.5–5.3)
PROT SERPL-MCNC: 7.2 G/DL (ref 6.4–8.4)
SODIUM SERPL-SCNC: 141 MMOL/L (ref 135–147)
TRIGL SERPL-MCNC: 271 MG/DL

## 2024-10-18 PROCEDURE — 80053 COMPREHEN METABOLIC PANEL: CPT

## 2024-10-18 PROCEDURE — 83036 HEMOGLOBIN GLYCOSYLATED A1C: CPT

## 2024-10-18 PROCEDURE — 99214 OFFICE O/P EST MOD 30 MIN: CPT | Performed by: FAMILY MEDICINE

## 2024-10-18 PROCEDURE — 36415 COLL VENOUS BLD VENIPUNCTURE: CPT

## 2024-10-18 PROCEDURE — 82043 UR ALBUMIN QUANTITATIVE: CPT

## 2024-10-18 PROCEDURE — 80061 LIPID PANEL: CPT

## 2024-10-18 PROCEDURE — 82570 ASSAY OF URINE CREATININE: CPT

## 2024-10-18 RX ORDER — GLIPIZIDE 5 MG/1
5 TABLET, FILM COATED, EXTENDED RELEASE ORAL 2 TIMES DAILY
Qty: 200 TABLET | Refills: 3 | Status: SHIPPED | OUTPATIENT
Start: 2024-10-18

## 2024-10-18 RX ORDER — ROSUVASTATIN CALCIUM 5 MG/1
5 TABLET, COATED ORAL DAILY
COMMUNITY

## 2024-10-18 NOTE — PROGRESS NOTES
Ambulatory Visit  Name: Shahbaz Spencer      : 1940      MRN: [id number]  Encounter Provider: Sofya Ross MD  Encounter Date: 10/18/2024   Encounter department: [facility]    Assessment & Plan  Type 2 diabetes mellitus without complication, without long-term current use of insulin (HCC)    Lab Results   Component Value Date    HGBA1C 8.2 (H) 10/18/2024   Improvement of glycemic control.  Continue metformin, Jardiance, Tradjenta and glipizide  Increase dose of glipizide from 5 mg daily to 5 mg twice daily  Patient's son will contact me immediately with any occurrences of hypoglycemia.    Orders:    glipiZIDE (GLUCOTROL XL) 5 mg 24 hr tablet; Take 1 tablet (5 mg total) by mouth 2 (two) times a day    Hemoglobin A1C; Future    Albumin / creatinine urine ratio; Future    Mixed hyperlipidemia  Triglycerides 271, total cholesterol 213  Start Crestor 3 days/week, .  Monitor lipid panel  Orders:    CBC and differential; Future    Comprehensive metabolic panel; Future    Lipid Panel with Direct LDL reflex; Future    TSH, 3rd generation; Future    Vitamin B12 deficiency  Continue vitamin B12 sublingual  Orders:    Vitamin B12; Future    Benign essential hypertension  Blood pressure is well-controlled.  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    History of Present Illness     Follow-up.  Patient is here today accompanied by her son  No complaints.  Medications updated, results of recent blood work reviewed.  Patient has been doing well on current med regimen.  Started glipizide ER 5 mg daily, tolerates well, no hypoglycemia  Hemoglobin A1c has significantly improved and went down from 11.3 to 8.2%.  Total cholesterol 213 with  triglycerides of 271.  Patient has Crestor on hand but has not been taking it lately.Patient has improved her diet,  consumes less bananas/carbohydrates.    She has been experiencing chronic joint aches and start of letrozole.        Review of Systems   Constitutional:  Positive for fatigue.   HENT: Negative.     Eyes: Negative.    Respiratory: Negative.     Cardiovascular: Negative.    Gastrointestinal: Negative.    Endocrine: Negative.    Genitourinary: Negative.    Musculoskeletal:  Positive for arthralgias.   Skin: Negative.    Allergic/Immunologic: Negative.    Neurological: Negative.    Hematological: Negative.    Psychiatric/Behavioral: Negative.       Past Medical History:   Diagnosis Date    Diabetes mellitus (HCC)     History of transfusion     Hyperlipidemia     TIA (transient ischemic attack)      Past Surgical History:   Procedure Laterality Date    HYSTERECTOMY      LYMPH NODE BIOPSY Left 10/14/2022    Procedure: LYMPHATIC MAPPING WITH BLUE AND RADIOACTIVE DYES (INJECT AT 0730 BY DR MILES IN THE OR);  Surgeon: Vernon Miles MD;  [facility];  Service: Surgical Oncology    MODIFIED RADICAL MASTECTOMY W/ AXILLARY LYMPH NODE DISSECTION Left 10/14/2022    Procedure: BREAST MODIFIED RADICAL MASTECTOMY; ROBBIE  DIRECTED;  Surgeon: Vernon Miles MD;  [facility];  Service: Surgical Oncology    SIMPLE MASTECTOMY Right 10/14/2022    Procedure: BREAST SIMPLE MASTECTOMY;  Surgeon: Vernon Miles MD;  [facility];  Service: Surgical Oncology    US BREAST NEEDLE LOC LEFT Left 9/16/2022    US BREAST NEEDLE LOC RIGHT EACH ADDITIONAL Right 9/16/2022    US GUIDED BREAST BIOPSY LEFT COMPLETE Left 9/16/2022    US GUIDED BREAST LYMPH NODE BIOPSY LEFT Left 9/16/2022     Family History   Problem Relation Age of Onset    Hypertension Mother     No Known Problems Father     Lung cancer Brother      Social History     Tobacco Use    Smoking status: Never    Smokeless tobacco: Never   Vaping Use    Vaping  status: Never Used   Substance and Sexual Activity    Alcohol use: Not Currently    Drug use: Never    Sexual activity: Not Currently     Current Outpatient Medications on File Prior to Visit   Medication Sig    rosuvastatin (CRESTOR) 5 mg tablet Take 5 mg by mouth daily Take 3 days per week    amLODIPine (NORVASC) 10 mg tablet Take 1 tablet (10 mg total) by mouth daily    ASPIRIN 81 PO Take 81 mg by mouth 2 (two) times a day    atenolol (TENORMIN) 25 mg tablet Take 1 tablet (25 mg total) by mouth daily    Calcium Carbonate-Vitamin D (CALCIUM-VITAMIN D3 PO) Take 1 tablet by mouth in the morning 500 mg/ 600 iu    citalopram (CeleXA) 20 mg tablet Take 1 tablet daily    Cyanocobalamin 1000 MCG SUBL Take 1 tablet daily under the tongue    Empagliflozin-metFORMIN HCl (Synjardy) 12.5-1000 MG TABS Take 1 tablet by mouth 2 (two) times a day    furosemide (LASIX) 20 mg tablet     letrozole (FEMARA) 2.5 mg tablet Take 2.5 mg by mouth daily    linaGLIPtin (Tradjenta) 5 MG TABS Take 5 mg by mouth daily    ramipril (ALTACE) 5 mg capsule Take 1 capsule (5 mg total) by mouth daily    [DISCONTINUED] glipiZIDE (GLUCOTROL XL) 5 mg 24 hr tablet Take 1 tablet (5 mg total) by mouth daily     No Known Allergies  Immunization History   Administered Date(s) Administered    INFLUENZA 10/25/2014, 12/21/2022    Influenza, high dose seasonal 0.7 mL 12/21/2022    Influenza, seasonal, injectable 10/25/2014    Pneumococcal Conjugate Vaccine 20-valent (Pcv20), Polysace 12/21/2022    Pneumococcal Polysaccharide PPV23 07/01/2014     Objective     /62   Pulse 62   Temp 98 °F (36.7 °C)   Resp 16   Wt 62 kg (136 lb 9.6 oz)   SpO2 98%   BMI 26.68 kg/m²     Physical Exam  Vitals and nursing note reviewed.   Constitutional:       General: She is not in acute distress.     Appearance: Normal appearance. She is well-developed. She is not ill-appearing.   HENT:      Head: Normocephalic and atraumatic.   Eyes:      General: No scleral icterus.      Conjunctiva/sclera: Conjunctivae normal.   Neck:      Vascular: No carotid bruit.   Cardiovascular:      Rate and Rhythm: Normal rate and regular rhythm.      Heart sounds: Normal heart sounds. No murmur heard.  Pulmonary:      Effort: Pulmonary effort is normal. No respiratory distress.      Breath sounds: Normal breath sounds.   Abdominal:      General: Bowel sounds are normal. There is no abdominal bruit.      Palpations: Abdomen is soft.      Tenderness: There is no abdominal tenderness.   Musculoskeletal:      Cervical back: Neck supple. No rigidity.      Right lower leg: No edema.      Left lower leg: No edema.   Skin:     General: Skin is warm.   Neurological:      General: No focal deficit present.      Mental Status: She is alert and oriented to person, place, and time.   Psychiatric:         Mood and Affect: Mood normal.         Behavior: Behavior normal.

## 2024-10-18 NOTE — ASSESSMENT & PLAN NOTE
Lab Results   Component Value Date    HGBA1C 8.2 (H) 10/18/2024   Improvement of glycemic control.  Continue metformin, Jardiance, Tradjenta and glipizide  Increase dose of glipizide from 5 mg daily to 5 mg twice daily  Patient's son will contact me immediately with any occurrences of hypoglycemia.    Orders:    glipiZIDE (GLUCOTROL XL) 5 mg 24 hr tablet; Take 1 tablet (5 mg total) by mouth 2 (two) times a day    Hemoglobin A1C; Future    Albumin / creatinine urine ratio; Future

## 2024-10-18 NOTE — ASSESSMENT & PLAN NOTE
Triglycerides 271, total cholesterol 213  Start Crestor 3 days/week, Monday Wednesday Friday.  Monitor lipid panel  Orders:    CBC and differential; Future    Comprehensive metabolic panel; Future    Lipid Panel with Direct LDL reflex; Future    TSH, 3rd generation; Future

## 2024-10-21 ENCOUNTER — TELEPHONE (OUTPATIENT)
Age: 84
End: 2024-10-21

## 2024-10-21 PROBLEM — R19.7 DIARRHEA IN ADULT PATIENT: Status: RESOLVED | Noted: 2022-12-28 | Resolved: 2024-10-21

## 2024-10-21 NOTE — TELEPHONE ENCOUNTER
Received call from [employer]. Patient has been working with her to get home and community based services. They need a physician certification form filled out. Confirmed fax number. Provided correct one as the one they had was wrong. They are going to fax again. However if we dont receive. They said you can go to [email or web address] and fill out the phorm and it will automatically be sent back. This is the last thing patient is waiting for, for  her application.

## 2025-03-15 ENCOUNTER — APPOINTMENT (OUTPATIENT)
Dept: LAB | Facility: CLINIC | Age: 85
End: 2025-03-15
Payer: COMMERCIAL

## 2025-03-15 DIAGNOSIS — E78.2 MIXED HYPERLIPIDEMIA: Chronic | ICD-10-CM

## 2025-03-15 DIAGNOSIS — E11.9 TYPE 2 DIABETES MELLITUS WITHOUT COMPLICATION, WITHOUT LONG-TERM CURRENT USE OF INSULIN (HCC): Chronic | ICD-10-CM

## 2025-03-15 DIAGNOSIS — E53.8 VITAMIN B12 DEFICIENCY: ICD-10-CM

## 2025-03-15 LAB
ALBUMIN SERPL BCG-MCNC: 4.1 G/DL (ref 3.5–5)
ALP SERPL-CCNC: 69 U/L (ref 34–104)
ALT SERPL W P-5'-P-CCNC: 14 U/L (ref 7–52)
ANION GAP SERPL CALCULATED.3IONS-SCNC: 12 MMOL/L (ref 4–13)
AST SERPL W P-5'-P-CCNC: 14 U/L (ref 13–39)
BASOPHILS # BLD AUTO: 0.08 THOUSANDS/ÂΜL (ref 0–0.1)
BASOPHILS NFR BLD AUTO: 1 % (ref 0–1)
BILIRUB SERPL-MCNC: 0.84 MG/DL (ref 0.2–1)
BUN SERPL-MCNC: 14 MG/DL (ref 5–25)
CALCIUM SERPL-MCNC: 9.6 MG/DL (ref 8.4–10.2)
CHLORIDE SERPL-SCNC: 103 MMOL/L (ref 96–108)
CHOLEST SERPL-MCNC: 206 MG/DL (ref ?–200)
CO2 SERPL-SCNC: 27 MMOL/L (ref 21–32)
CREAT SERPL-MCNC: 0.98 MG/DL (ref 0.6–1.3)
CREAT UR-MCNC: 39.8 MG/DL
EOSINOPHIL # BLD AUTO: 0.13 THOUSAND/ÂΜL (ref 0–0.61)
EOSINOPHIL NFR BLD AUTO: 2 % (ref 0–6)
ERYTHROCYTE [DISTWIDTH] IN BLOOD BY AUTOMATED COUNT: 14.6 % (ref 11.6–15.1)
EST. AVERAGE GLUCOSE BLD GHB EST-MCNC: 235 MG/DL
GFR SERPL CREATININE-BSD FRML MDRD: 53 ML/MIN/1.73SQ M
GLUCOSE P FAST SERPL-MCNC: 240 MG/DL (ref 65–99)
HBA1C MFR BLD: 9.8 %
HCT VFR BLD AUTO: 37.8 % (ref 34.8–46.1)
HDLC SERPL-MCNC: 60 MG/DL
HGB BLD-MCNC: 11.5 G/DL (ref 11.5–15.4)
IMM GRANULOCYTES # BLD AUTO: 0.01 THOUSAND/UL (ref 0–0.2)
IMM GRANULOCYTES NFR BLD AUTO: 0 % (ref 0–2)
LDLC SERPL CALC-MCNC: 118 MG/DL (ref 0–100)
LYMPHOCYTES # BLD AUTO: 1.9 THOUSANDS/ÂΜL (ref 0.6–4.47)
LYMPHOCYTES NFR BLD AUTO: 31 % (ref 14–44)
MCH RBC QN AUTO: 24.3 PG (ref 26.8–34.3)
MCHC RBC AUTO-ENTMCNC: 30.4 G/DL (ref 31.4–37.4)
MCV RBC AUTO: 80 FL (ref 82–98)
MICROALBUMIN UR-MCNC: 35.5 MG/L
MICROALBUMIN/CREAT 24H UR: 89 MG/G CREATININE (ref 0–30)
MONOCYTES # BLD AUTO: 0.52 THOUSAND/ÂΜL (ref 0.17–1.22)
MONOCYTES NFR BLD AUTO: 9 % (ref 4–12)
NEUTROPHILS # BLD AUTO: 3.44 THOUSANDS/ÂΜL (ref 1.85–7.62)
NEUTS SEG NFR BLD AUTO: 57 % (ref 43–75)
NRBC BLD AUTO-RTO: 0 /100 WBCS
PLATELET # BLD AUTO: 258 THOUSANDS/UL (ref 149–390)
PMV BLD AUTO: 12.3 FL (ref 8.9–12.7)
POTASSIUM SERPL-SCNC: 4.5 MMOL/L (ref 3.5–5.3)
PROT SERPL-MCNC: 6.3 G/DL (ref 6.4–8.4)
RBC # BLD AUTO: 4.73 MILLION/UL (ref 3.81–5.12)
SODIUM SERPL-SCNC: 142 MMOL/L (ref 135–147)
TRIGL SERPL-MCNC: 142 MG/DL (ref ?–150)
TSH SERPL DL<=0.05 MIU/L-ACNC: 1.63 UIU/ML (ref 0.45–4.5)
VIT B12 SERPL-MCNC: 552 PG/ML (ref 180–914)
WBC # BLD AUTO: 6.08 THOUSAND/UL (ref 4.31–10.16)

## 2025-03-15 PROCEDURE — 36415 COLL VENOUS BLD VENIPUNCTURE: CPT

## 2025-03-15 PROCEDURE — 80053 COMPREHEN METABOLIC PANEL: CPT

## 2025-03-15 PROCEDURE — 82607 VITAMIN B-12: CPT

## 2025-03-15 PROCEDURE — 80061 LIPID PANEL: CPT

## 2025-03-15 PROCEDURE — 85025 COMPLETE CBC W/AUTO DIFF WBC: CPT

## 2025-03-15 PROCEDURE — 82570 ASSAY OF URINE CREATININE: CPT

## 2025-03-15 PROCEDURE — 84443 ASSAY THYROID STIM HORMONE: CPT

## 2025-03-15 PROCEDURE — 83036 HEMOGLOBIN GLYCOSYLATED A1C: CPT

## 2025-03-15 PROCEDURE — 82043 UR ALBUMIN QUANTITATIVE: CPT

## 2025-03-21 ENCOUNTER — OFFICE VISIT (OUTPATIENT)
Dept: FAMILY MEDICINE CLINIC | Facility: CLINIC | Age: 85
End: 2025-03-21
Payer: COMMERCIAL

## 2025-03-21 VITALS
BODY MASS INDEX: 27.13 KG/M2 | TEMPERATURE: 97.8 F | DIASTOLIC BLOOD PRESSURE: 80 MMHG | SYSTOLIC BLOOD PRESSURE: 128 MMHG | WEIGHT: 138.2 LBS | RESPIRATION RATE: 16 BRPM | HEIGHT: 60 IN | HEART RATE: 67 BPM | OXYGEN SATURATION: 98 %

## 2025-03-21 DIAGNOSIS — I10 BENIGN ESSENTIAL HYPERTENSION: ICD-10-CM

## 2025-03-21 DIAGNOSIS — E11.9 TYPE 2 DIABETES MELLITUS WITHOUT COMPLICATION, WITHOUT LONG-TERM CURRENT USE OF INSULIN (HCC): Primary | Chronic | ICD-10-CM

## 2025-03-21 DIAGNOSIS — F41.8 DEPRESSION WITH ANXIETY: Chronic | ICD-10-CM

## 2025-03-21 DIAGNOSIS — Z78.0 MENOPAUSE: ICD-10-CM

## 2025-03-21 DIAGNOSIS — C50.912 CARCINOMA OF LEFT BREAST METASTATIC TO AXILLARY LYMPH NODE (HCC): ICD-10-CM

## 2025-03-21 DIAGNOSIS — R71.8 MICROCYTOSIS: ICD-10-CM

## 2025-03-21 DIAGNOSIS — C77.3 CARCINOMA OF LEFT BREAST METASTATIC TO AXILLARY LYMPH NODE (HCC): ICD-10-CM

## 2025-03-21 DIAGNOSIS — E78.2 MIXED HYPERLIPIDEMIA: Chronic | ICD-10-CM

## 2025-03-21 PROCEDURE — 99214 OFFICE O/P EST MOD 30 MIN: CPT | Performed by: FAMILY MEDICINE

## 2025-03-21 PROCEDURE — G2211 COMPLEX E/M VISIT ADD ON: HCPCS | Performed by: FAMILY MEDICINE

## 2025-03-21 NOTE — ASSESSMENT & PLAN NOTE
Lab Results   Component Value Date    HGBA1C 9.8 (H) 03/15/2025   Poor glycemic control on regimen of glipizide, metformin, Jardiance and Tradjenta.  Patient has not been compliant with diabetic diet.  We will stop Tradjenta and start Trulicity 0.75 mg weekly.  Patient's son will contact me with her blood glucose readings we may consider increasing dose further to 1.5 mg weekly as long as patient tolerates Rx well.  Discussed increased protein and vegetable intake.  Moderate fruit and carbohydrates    Orders:  •  Hemoglobin A1C; Future  •  Albumin / creatinine urine ratio; Future  •  Comprehensive metabolic panel; Future  •  CBC and differential; Future  •  Comprehensive metabolic panel; Future  •  Hemoglobin A1C; Future

## 2025-03-21 NOTE — PROGRESS NOTES
Name: Shahbaz Spencer      : 1940      MRN: [id number]  Encounter Provider: Sofya Ross MD  Encounter Date: 3/21/2025   Encounter department: [facility]    Assessment & Plan  Type 2 diabetes mellitus without complication, without long-term current use of insulin (HCC)    Lab Results   Component Value Date    HGBA1C 9.8 (H) 03/15/2025   Poor glycemic control on regimen of glipizide, metformin, Jardiance and Tradjenta.  Patient has not been compliant with diabetic diet.  We will stop Tradjenta and start Trulicity 0.75 mg weekly.  Patient's son will contact me with her blood glucose readings we may consider increasing dose further to 1.5 mg weekly as long as patient tolerates Rx well.  Discussed increased protein and vegetable intake.  Moderate fruit and carbohydrates    Orders:  •  Hemoglobin A1C; Future  •  Albumin / creatinine urine ratio; Future  •  Comprehensive metabolic panel; Future  •  CBC and differential; Future  •  Comprehensive metabolic panel; Future  •  Hemoglobin A1C; Future    Microcytosis  Microcytosis with low normal hemoglobin of 11.5.  No abdominal pain, melena or bright red blood per rectum.  Vitamin B12 level is normal RDW is normal. Proceed with FOBT x 3.  Patient is a dentulous, diet is somewhat restricted.  I recommend to increase animal protein/meat, chicken, fish.  Monitor labs, repeat in 3 months  Orders:  •  Iron Panel (Includes Ferritin, Iron Sat%, Iron, and TIBC); Future  •  CBC and differential; Future    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 (HCC)  Patient remains on Femara.  Proceed with DEXA scan       Mixed hyperlipidemia  Total cholesterol 206 with . Patient has been off Crestor.  Recommend to restart rosuvastatin 5 mg on .  If she develops myalgias/arthralgias-patient's son will let me know  Orders:  •  Lipid Panel with Direct LDL reflex; Future  •  TSH, 3rd generation;  Future    Menopause    Orders:  •  DXA bone density spine hip and pelvis; Future    Benign essential hypertension  Blood pressure is well-controlled, continue current regimen       Depression with anxiety  Depression Screening Follow-up Plan: Patient's depression screening was positive with a PHQ-9 score of 13. Patient with underlying depression and was advised to continue current medications as prescribed. Patient advised to follow-up with PCP for further management.    Patient is doing well on citalopram 20 mg daily             Depression Screening and Follow-up Plan: Patient's depression screening was positive with a PHQ-9 score of 13.   Patient with underlying depression and was advised to continue current medications as prescribed. Patient advised to follow-up with PCP for further management.       Patient Instructions   Trulicity 0.75 mg weekly.  We may consider increasing dose to 1.5 pending blood sugars  STOP Tradjenta  Please restart Crestor as 1 tablet Monday Wednesday Friday  8-hour fasting blood work in 3 months  12 hour fasting  in September  FOBTx3  DEXA    Return in about 6 months (around 9/21/2025) for Medicare wellness, follow up.    Blood work in 3 months, follow via phone call, follow-up in the office 6 months  History of Present Illness     Follow-up chronic medical conditions.  Patient is accompanied by her son,  Javier.  Results of recent blood work reviewed.  Hemoglobin is normal, there is microcytosis.  Patient's diet is limited, she is a dentulous, enjoying a lot of bananas, not following diabetic diet.  Meat/chicken intake is somewhat limited.  Lab Results       Component                Value               Date                       WBC                      6.08                03/15/2025                 HGB                      11.5                03/15/2025                 HCT                      37.8                03/15/2025                 MCV                      80 (L)               03/15/2025                 PLT                      258                 03/15/2025          Hemoglobin A1c is elevated 9.8%.  Patient has been compliant with Rx.    Cholesterol is elevated, patient has been off Crestor.  Patient's son is concerned about possibility of myalgias arthralgias due to statin.    Breast cancer: She remains on Femara.            Review of Systems   Constitutional: Negative.    HENT: Negative.     Eyes: Negative.    Respiratory: Negative.     Cardiovascular: Negative.    Gastrointestinal: Negative.  Negative for abdominal pain and blood in stool.   Musculoskeletal: Negative.    Neurological: Negative.    Psychiatric/Behavioral: Negative.       Past Medical History:   Diagnosis Date   • Diabetes mellitus (HCC)    • Diarrhea in adult patient 12/28/2022   • History of transfusion    • Hyperlipidemia    • TIA (transient ischemic attack)      Past Surgical History:   Procedure Laterality Date   • HYSTERECTOMY     • LYMPH NODE BIOPSY Left 10/14/2022    Procedure: LYMPHATIC MAPPING WITH BLUE AND RADIOACTIVE DYES (INJECT AT 0730 BY DR MILES IN THE OR);  Surgeon: Vernon Miles MD;  Location: [facility];  Service: Surgical Oncology   • MODIFIED RADICAL MASTECTOMY W/ AXILLARY LYMPH NODE DISSECTION Left 10/14/2022    Procedure: BREAST MODIFIED RADICAL MASTECTOMY; ROBBIE  DIRECTED;  Surgeon: Vernon Miles MD;  Location: [facility];  Service: Surgical Oncology   • SIMPLE MASTECTOMY Right 10/14/2022    Procedure: BREAST SIMPLE MASTECTOMY;  Surgeon: Vernon Miles MD;  Location: [facility];  Service: Surgical Oncology   • US BREAST NEEDLE LOC LEFT Left 9/16/2022   • US BREAST NEEDLE LOC RIGHT EACH ADDITIONAL Right 9/16/2022   • US GUIDED BREAST BIOPSY LEFT COMPLETE Left 9/16/2022   • US GUIDED BREAST LYMPH NODE BIOPSY LEFT Left 9/16/2022     Family History   Problem Relation Age of Onset   • Hypertension Mother    • No Known Problems Father    • Lung cancer Brother      Social History     Tobacco Use   • Smoking  status: Never   • Smokeless tobacco: Never   Vaping Use   • Vaping status: Never Used   Substance and Sexual Activity   • Alcohol use: Not Currently   • Drug use: Never   • Sexual activity: Not Currently     Current Outpatient Medications on File Prior to Visit   Medication Sig   • amLODIPine (NORVASC) 10 mg tablet Take 1 tablet (10 mg total) by mouth daily   • ASPIRIN 81 PO Take 81 mg by mouth 2 (two) times a day   • atenolol (TENORMIN) 25 mg tablet Take 1 tablet (25 mg total) by mouth daily   • Calcium Carbonate-Vitamin D (CALCIUM-VITAMIN D3 PO) Take 1 tablet by mouth in the morning 500 mg/ 600 iu   • citalopram (CeleXA) 20 mg tablet Take 1 tablet daily   • Empagliflozin-metFORMIN HCl (Synjardy) 12.5-1000 MG TABS Take 1 tablet by mouth 2 (two) times a day   • furosemide (LASIX) 20 mg tablet    • glipiZIDE (GLUCOTROL XL) 5 mg 24 hr tablet Take 1 tablet (5 mg total) by mouth 2 (two) times a day   • letrozole (FEMARA) 2.5 mg tablet Take 2.5 mg by mouth daily   • ramipril (ALTACE) 5 mg capsule Take 1 capsule (5 mg total) by mouth daily   • Cyanocobalamin 1000 MCG SUBL Take 1 tablet daily under the tongue   • rosuvastatin (CRESTOR) 5 mg tablet Take 5 mg by mouth daily Take 3 days per week     No Known Allergies  Immunization History   Administered Date(s) Administered   • INFLUENZA 10/25/2014, 12/21/2022   • Influenza, high dose seasonal 0.7 mL 12/21/2022   • Influenza, seasonal, injectable 10/25/2014   • Pneumococcal Conjugate Vaccine 20-valent (Pcv20), Polysace 12/21/2022   • Pneumococcal Polysaccharide PPV23 07/01/2014     Objective   /80 (BP Location: Left arm, Patient Position: Sitting, Cuff Size: Standard)   Pulse 67   Temp 97.8 °F (36.6 °C) (Temporal)   Resp 16   Ht 5' (1.524 m)   Wt 62.7 kg (138 lb 3.2 oz)   SpO2 98%   BMI 26.99 kg/m²     Physical Exam  Vitals and nursing note reviewed.   Constitutional:       General: She is not in acute distress.     Appearance: Normal appearance. She is  well-developed. She is not ill-appearing.   HENT:      Head: Normocephalic and atraumatic.   Eyes:      General: No scleral icterus.     Conjunctiva/sclera: Conjunctivae normal.   Neck:      Vascular: No carotid bruit.   Cardiovascular:      Rate and Rhythm: Normal rate and regular rhythm.      Heart sounds: S1 normal and S2 normal. Murmur heard.      Systolic murmur is present with a grade of 1/6.   Pulmonary:      Effort: Pulmonary effort is normal. No respiratory distress.      Breath sounds: Normal breath sounds.   Abdominal:      General: Bowel sounds are normal. There is no abdominal bruit.      Palpations: Abdomen is soft.      Tenderness: There is no abdominal tenderness.   Musculoskeletal:      Cervical back: Neck supple. No rigidity.      Right lower leg: No edema.      Left lower leg: No edema.   Skin:     General: Skin is warm.   Neurological:      General: No focal deficit present.      Mental Status: She is alert and oriented to person, place, and time.   Psychiatric:         Behavior: Behavior normal.

## 2025-03-21 NOTE — PATIENT INSTRUCTIONS
Trulicity 0.75 mg weekly.  We may consider increasing dose to 1.5 pending blood sugars  STOP Tradjenta  Please restart Crestor as 1 tablet Monday Wednesday Friday  8-hour fasting blood work in 3 months  12 hour fasting  in September FOBTx3  DEXA

## 2025-03-21 NOTE — ASSESSMENT & PLAN NOTE
Total cholesterol 206 with . Patient has been off Crestor.  Recommend to restart rosuvastatin 5 mg on Monday Wednesday Friday.  If she develops myalgias/arthralgias-patient's son will let me know  Orders:  •  Lipid Panel with Direct LDL reflex; Future  •  TSH, 3rd generation; Future

## 2025-03-25 PROBLEM — R71.8 MICROCYTOSIS: Status: ACTIVE | Noted: 2025-03-25

## 2025-03-25 NOTE — ASSESSMENT & PLAN NOTE
Depression Screening Follow-up Plan: Patient's depression screening was positive with a PHQ-9 score of 13. Patient with underlying depression and was advised to continue current medications as prescribed. Patient advised to follow-up with PCP for further management.    Patient is doing well on citalopram 20 mg daily          [Negative] : Allergic/Immunologic

## 2025-03-25 NOTE — ASSESSMENT & PLAN NOTE
Microcytosis with low normal hemoglobin of 11.5.  No abdominal pain, melena or bright red blood per rectum.  Vitamin B12 level is normal RDW is normal. Proceed with FOBT x 3.  Patient is a dentulous, diet is somewhat restricted.  I recommend to increase animal protein/meat, chicken, fish.  Monitor labs, repeat in 3 months  Orders:  •  Iron Panel (Includes Ferritin, Iron Sat%, Iron, and TIBC); Future  •  CBC and differential; Future

## 2025-05-20 DIAGNOSIS — C50.912 CARCINOMA OF LEFT BREAST METASTATIC TO AXILLARY LYMPH NODE (HCC): Primary | ICD-10-CM

## 2025-05-20 DIAGNOSIS — C77.3 CARCINOMA OF LEFT BREAST METASTATIC TO AXILLARY LYMPH NODE (HCC): Primary | ICD-10-CM

## 2025-05-20 RX ORDER — EXEMESTANE 25 MG/1
25 TABLET ORAL DAILY
Qty: 90 TABLET | Refills: 3 | Status: SHIPPED | OUTPATIENT
Start: 2025-05-20

## 2025-05-20 NOTE — PROGRESS NOTES
Significant musculoskeletal pain while she was on anastrozole and later on letrozole without any improvement.  Will arrange for Aromasin 25 mg p.o. daily

## 2025-06-03 ENCOUNTER — DOCUMENTATION (OUTPATIENT)
Dept: ADMINISTRATIVE | Facility: OTHER | Age: 85
End: 2025-06-03

## 2025-06-03 NOTE — PROGRESS NOTES
06/03/25 12:49 PM     DM A1c outreach is not required, patient has an upcoming appointment with the PCP office.    Thank you.  Daniella Haywood MA  [facility]

## 2025-06-16 DIAGNOSIS — E11.9 TYPE 2 DIABETES MELLITUS WITHOUT COMPLICATION, WITHOUT LONG-TERM CURRENT USE OF INSULIN (HCC): Chronic | ICD-10-CM

## 2025-06-16 RX ORDER — EMPAGLIFLOZIN AND METFORMIN HYDROCHLORIDE 12.5; 1 MG/1; MG/1
TABLET ORAL
Qty: 180 TABLET | Refills: 1 | Status: SHIPPED | OUTPATIENT
Start: 2025-06-16

## 2025-08-23 DIAGNOSIS — I10 BENIGN ESSENTIAL HYPERTENSION: Primary | ICD-10-CM

## 2025-08-23 RX ORDER — LOSARTAN POTASSIUM AND HYDROCHLOROTHIAZIDE 12.5; 5 MG/1; MG/1
1 TABLET ORAL DAILY
Qty: 90 TABLET | Refills: 3 | Status: SHIPPED | OUTPATIENT
Start: 2025-08-23

## 2025-08-23 RX ORDER — AMLODIPINE BESYLATE 5 MG/1
5 TABLET ORAL DAILY
Qty: 90 TABLET | Refills: 3 | Status: SHIPPED | OUTPATIENT
Start: 2025-08-23

## (undated) DEVICE — BETHLEHEM UNIVERSAL BREAST PK: Brand: CARDINAL HEALTH

## (undated) DEVICE — SUT VICRYL 3-0 SH 27 IN J416H

## (undated) DEVICE — NEEDLE 25G X 1 1/2

## (undated) DEVICE — DRAPE PROBE NEO-PROBE/ULTRASOUND

## (undated) DEVICE — SUT VICRYL 2-0 REEL 54 IN J286G

## (undated) DEVICE — GLOVE SRG BIOGEL 7

## (undated) DEVICE — MEDI-VAC YANK SUCT HNDL W/TPRD BULBOUS TIP: Brand: CARDINAL HEALTH

## (undated) DEVICE — SUT SILK 2-0 SH 30 IN K833H

## (undated) DEVICE — SMOKE EVACUATION TUBING WITH 8 IN INTEGRAL WAND AND SPONGE GUARD: Brand: BUFFALO FILTER

## (undated) DEVICE — PENCIL ELECTROSURG E-Z CLEAN -0035H

## (undated) DEVICE — TUBING SUCTION 5MM X 12 FT

## (undated) DEVICE — 3M™ STERI-STRIP™ REINFORCED ADHESIVE SKIN CLOSURES, R1547, 1/2 IN X 4 IN (12 MM X 100 MM), 6 STRIPS/ENVELOPE: Brand: 3M™ STERI-STRIP™

## (undated) DEVICE — LIGACLIP MCA MULTIPLE CLIP APPLIERS, 20 MEDIUM CLIPS: Brand: LIGACLIP

## (undated) DEVICE — DRAPE EQUIPMENT RF WAND

## (undated) DEVICE — BETHLEHEM UNIVERSAL MINOR GEN: Brand: CARDINAL HEALTH

## (undated) DEVICE — ABDOMINAL PAD: Brand: DERMACEA

## (undated) DEVICE — SUT VICRYL 2-0 SH 27 IN UNDYED J417H

## (undated) DEVICE — VIAL DECANTER

## (undated) DEVICE — CHEST/BREAST DRAPE: Brand: CONVERTORS

## (undated) DEVICE — SUT MONOCRYL 4-0 PS-2 18 IN Y496G

## (undated) DEVICE — ADHESIVE SKIN HIGH VISCOSITY EXOFIN 1ML

## (undated) DEVICE — JP CHANNEL DRAIN 19FR, FULL FLUTES: Brand: JACKSON-PRATT

## (undated) DEVICE — SYRINGE 1ML TB 26G X 3/8 SAFETY

## (undated) DEVICE — HANDPIECE, SINGLE-USE, SAVI SCOUT®: Brand: SAVI SCOUT®

## (undated) DEVICE — GAUZE SPONGES,16 PLY: Brand: CURITY

## (undated) DEVICE — CHLORAPREP HI-LITE 26ML ORANGE

## (undated) DEVICE — INTENDED FOR TISSUE SEPARATION, AND OTHER PROCEDURES THAT REQUIRE A SHARP SURGICAL BLADE TO PUNCTURE OR CUT.: Brand: BARD-PARKER SAFETY BLADES SIZE 15, STERILE

## (undated) DEVICE — ELECTRODE BLADE MOD  E-Z CLEAN 6.5IN -0014M